# Patient Record
Sex: MALE | Race: BLACK OR AFRICAN AMERICAN | NOT HISPANIC OR LATINO | Employment: FULL TIME | URBAN - METROPOLITAN AREA
[De-identification: names, ages, dates, MRNs, and addresses within clinical notes are randomized per-mention and may not be internally consistent; named-entity substitution may affect disease eponyms.]

---

## 2018-03-23 ENCOUNTER — APPOINTMENT (OUTPATIENT)
Dept: CARDIOLOGY | Facility: CLINIC | Age: 58
End: 2018-03-23
Attending: NURSE PRACTITIONER
Payer: COMMERCIAL

## 2018-03-23 ENCOUNTER — HOSPITAL ENCOUNTER (INPATIENT)
Facility: CLINIC | Age: 58
LOS: 1 days | Discharge: HOME | End: 2018-03-24
Attending: EMERGENCY MEDICINE | Admitting: INTERNAL MEDICINE
Payer: COMMERCIAL

## 2018-03-23 ENCOUNTER — APPOINTMENT (OUTPATIENT)
Dept: GENERAL RADIOLOGY | Facility: CLINIC | Age: 58
End: 2018-03-23
Attending: EMERGENCY MEDICINE
Payer: COMMERCIAL

## 2018-03-23 DIAGNOSIS — I21.4 NSTEMI (NON-ST ELEVATED MYOCARDIAL INFARCTION) (H): ICD-10-CM

## 2018-03-23 PROBLEM — R07.9 CHEST PAIN: Status: ACTIVE | Noted: 2018-03-23

## 2018-03-23 LAB
ANION GAP SERPL CALCULATED.3IONS-SCNC: 8 MMOL/L (ref 3–14)
BASOPHILS # BLD AUTO: 0.1 10E9/L (ref 0–0.2)
BASOPHILS NFR BLD AUTO: 0.9 %
BUN SERPL-MCNC: 12 MG/DL (ref 7–30)
CALCIUM SERPL-MCNC: 8.2 MG/DL (ref 8.5–10.1)
CHLORIDE SERPL-SCNC: 104 MMOL/L (ref 94–109)
CO2 SERPL-SCNC: 25 MMOL/L (ref 20–32)
CREAT SERPL-MCNC: 0.74 MG/DL (ref 0.66–1.25)
DIFFERENTIAL METHOD BLD: NORMAL
EOSINOPHIL # BLD AUTO: 0.4 10E9/L (ref 0–0.7)
EOSINOPHIL NFR BLD AUTO: 7.5 %
ERYTHROCYTE [DISTWIDTH] IN BLOOD BY AUTOMATED COUNT: 12.7 % (ref 10–15)
GFR SERPL CREATININE-BSD FRML MDRD: >90 ML/MIN/1.7M2
GLUCOSE SERPL-MCNC: 114 MG/DL (ref 70–99)
HCT VFR BLD AUTO: 44.7 % (ref 40–53)
HGB BLD-MCNC: 15.1 G/DL (ref 13.3–17.7)
IMM GRANULOCYTES # BLD: 0 10E9/L (ref 0–0.4)
IMM GRANULOCYTES NFR BLD: 0.4 %
INTERPRETATION ECG - MUSE: NORMAL
KCT BLD-ACNC: 261 SEC (ref 75–150)
LYMPHOCYTES # BLD AUTO: 1.6 10E9/L (ref 0.8–5.3)
LYMPHOCYTES NFR BLD AUTO: 29.1 %
MCH RBC QN AUTO: 30 PG (ref 26.5–33)
MCHC RBC AUTO-ENTMCNC: 33.8 G/DL (ref 31.5–36.5)
MCV RBC AUTO: 89 FL (ref 78–100)
MONOCYTES # BLD AUTO: 0.4 10E9/L (ref 0–1.3)
MONOCYTES NFR BLD AUTO: 7.6 %
NEUTROPHILS # BLD AUTO: 3.1 10E9/L (ref 1.6–8.3)
NEUTROPHILS NFR BLD AUTO: 54.5 %
NRBC # BLD AUTO: 0 10*3/UL
NRBC BLD AUTO-RTO: 0 /100
PLATELET # BLD AUTO: 203 10E9/L (ref 150–450)
POTASSIUM SERPL-SCNC: 4 MMOL/L (ref 3.4–5.3)
RBC # BLD AUTO: 5.04 10E12/L (ref 4.4–5.9)
SODIUM SERPL-SCNC: 137 MMOL/L (ref 133–144)
TROPONIN I BLD-MCNC: 0.07 UG/L (ref 0–0.1)
TROPONIN I SERPL-MCNC: 0.08 UG/L (ref 0–0.04)
TROPONIN I SERPL-MCNC: 0.1 UG/L (ref 0–0.04)
TROPONIN I SERPL-MCNC: 0.12 UG/L (ref 0–0.04)
WBC # BLD AUTO: 5.6 10E9/L (ref 4–11)

## 2018-03-23 PROCEDURE — C1769 GUIDE WIRE: HCPCS

## 2018-03-23 PROCEDURE — 84484 ASSAY OF TROPONIN QUANT: CPT

## 2018-03-23 PROCEDURE — 93306 TTE W/DOPPLER COMPLETE: CPT

## 2018-03-23 PROCEDURE — 25000132 ZZH RX MED GY IP 250 OP 250 PS 637

## 2018-03-23 PROCEDURE — C9600 PERC DRUG-EL COR STENT SING: HCPCS | Mod: LD

## 2018-03-23 PROCEDURE — C1887 CATHETER, GUIDING: HCPCS

## 2018-03-23 PROCEDURE — 96375 TX/PRO/DX INJ NEW DRUG ADDON: CPT | Performed by: EMERGENCY MEDICINE

## 2018-03-23 PROCEDURE — 80048 BASIC METABOLIC PNL TOTAL CA: CPT | Performed by: EMERGENCY MEDICINE

## 2018-03-23 PROCEDURE — 93454 CORONARY ARTERY ANGIO S&I: CPT | Mod: 26 | Performed by: INTERNAL MEDICINE

## 2018-03-23 PROCEDURE — 25000132 ZZH RX MED GY IP 250 OP 250 PS 637: Performed by: STUDENT IN AN ORGANIZED HEALTH CARE EDUCATION/TRAINING PROGRAM

## 2018-03-23 PROCEDURE — 85025 COMPLETE CBC W/AUTO DIFF WBC: CPT | Performed by: EMERGENCY MEDICINE

## 2018-03-23 PROCEDURE — 99153 MOD SED SAME PHYS/QHP EA: CPT

## 2018-03-23 PROCEDURE — 93005 ELECTROCARDIOGRAM TRACING: CPT | Performed by: EMERGENCY MEDICINE

## 2018-03-23 PROCEDURE — C1725 CATH, TRANSLUMIN NON-LASER: HCPCS

## 2018-03-23 PROCEDURE — 92941 PRQ TRLML REVSC TOT OCCL AMI: CPT | Mod: LD | Performed by: INTERNAL MEDICINE

## 2018-03-23 PROCEDURE — 96374 THER/PROPH/DIAG INJ IV PUSH: CPT | Performed by: EMERGENCY MEDICINE

## 2018-03-23 PROCEDURE — 99152 MOD SED SAME PHYS/QHP 5/>YRS: CPT

## 2018-03-23 PROCEDURE — 84484 ASSAY OF TROPONIN QUANT: CPT | Performed by: NURSE PRACTITIONER

## 2018-03-23 PROCEDURE — 93010 ELECTROCARDIOGRAM REPORT: CPT | Mod: Z6 | Performed by: EMERGENCY MEDICINE

## 2018-03-23 PROCEDURE — 99291 CRITICAL CARE FIRST HOUR: CPT | Mod: 25 | Performed by: EMERGENCY MEDICINE

## 2018-03-23 PROCEDURE — 36415 COLL VENOUS BLD VENIPUNCTURE: CPT | Performed by: NURSE PRACTITIONER

## 2018-03-23 PROCEDURE — 92929 ZZHC PRQ TRLUML CORONARY BM STENT W/ANGIO ADDL ART/BRNCH: CPT | Mod: RC | Performed by: INTERNAL MEDICINE

## 2018-03-23 PROCEDURE — 84484 ASSAY OF TROPONIN QUANT: CPT | Performed by: EMERGENCY MEDICINE

## 2018-03-23 PROCEDURE — C1874 STENT, COATED/COV W/DEL SYS: HCPCS

## 2018-03-23 PROCEDURE — 25000125 ZZHC RX 250: Performed by: INTERNAL MEDICINE

## 2018-03-23 PROCEDURE — 27210760 ZZH DEVICE INFLATION CR7

## 2018-03-23 PROCEDURE — 93454 CORONARY ARTERY ANGIO S&I: CPT

## 2018-03-23 PROCEDURE — 93005 ELECTROCARDIOGRAM TRACING: CPT

## 2018-03-23 PROCEDURE — 25000128 H RX IP 250 OP 636: Performed by: INTERNAL MEDICINE

## 2018-03-23 PROCEDURE — 36415 COLL VENOUS BLD VENIPUNCTURE: CPT | Performed by: INTERNAL MEDICINE

## 2018-03-23 PROCEDURE — 21400006 ZZH R&B CCU INTERMEDIATE UMMC

## 2018-03-23 PROCEDURE — 85347 COAGULATION TIME ACTIVATED: CPT

## 2018-03-23 PROCEDURE — 27210843 ZZH DEVICE COMPRESSION CR12

## 2018-03-23 PROCEDURE — 25000128 H RX IP 250 OP 636: Performed by: EMERGENCY MEDICINE

## 2018-03-23 PROCEDURE — 25500064 ZZH RX 255 OP 636: Performed by: NURSE PRACTITIONER

## 2018-03-23 PROCEDURE — 27210893 ZZH CATH CR5

## 2018-03-23 PROCEDURE — 27210946 ZZH KIT HC TOTES DISP CR8

## 2018-03-23 PROCEDURE — 27211089 ZZH KIT ACIST INJECTOR CR3

## 2018-03-23 PROCEDURE — 99222 1ST HOSP IP/OBS MODERATE 55: CPT | Mod: 25 | Performed by: NURSE PRACTITIONER

## 2018-03-23 PROCEDURE — 027237Z DILATION OF CORONARY ARTERY, THREE ARTERIES WITH FOUR OR MORE DRUG-ELUTING INTRALUMINAL DEVICES, PERCUTANEOUS APPROACH: ICD-10-PCS | Performed by: INTERNAL MEDICINE

## 2018-03-23 PROCEDURE — 71046 X-RAY EXAM CHEST 2 VIEWS: CPT

## 2018-03-23 PROCEDURE — B2111ZZ FLUOROSCOPY OF MULTIPLE CORONARY ARTERIES USING LOW OSMOLAR CONTRAST: ICD-10-PCS | Performed by: INTERNAL MEDICINE

## 2018-03-23 PROCEDURE — 27210762 ZZH DEVICE SUTURELESS SECUREMENT EA CR2

## 2018-03-23 PROCEDURE — C1894 INTRO/SHEATH, NON-LASER: HCPCS

## 2018-03-23 PROCEDURE — 93010 ELECTROCARDIOGRAM REPORT: CPT | Performed by: INTERNAL MEDICINE

## 2018-03-23 PROCEDURE — 27210787 ZZH MANIFOLD CR2

## 2018-03-23 PROCEDURE — 99285 EMERGENCY DEPT VISIT HI MDM: CPT | Mod: 25 | Performed by: EMERGENCY MEDICINE

## 2018-03-23 PROCEDURE — 93306 TTE W/DOPPLER COMPLETE: CPT | Mod: 26 | Performed by: INTERNAL MEDICINE

## 2018-03-23 PROCEDURE — 84484 ASSAY OF TROPONIN QUANT: CPT | Performed by: INTERNAL MEDICINE

## 2018-03-23 PROCEDURE — 96376 TX/PRO/DX INJ SAME DRUG ADON: CPT | Performed by: EMERGENCY MEDICINE

## 2018-03-23 PROCEDURE — 92928 PRQ TCAT PLMT NTRAC ST 1 LES: CPT | Mod: 59 | Performed by: INTERNAL MEDICINE

## 2018-03-23 PROCEDURE — 27210998 ZZH ACCESS HEART CATH CR6

## 2018-03-23 RX ORDER — ASPIRIN 81 MG/1
81-324 TABLET, CHEWABLE ORAL
Status: DISCONTINUED | OUTPATIENT
Start: 2018-03-23 | End: 2018-03-23 | Stop reason: HOSPADM

## 2018-03-23 RX ORDER — EPTIFIBATIDE 2 MG/ML
1 INJECTION, SOLUTION INTRAVENOUS CONTINUOUS PRN
Status: DISCONTINUED | OUTPATIENT
Start: 2018-03-23 | End: 2018-03-23 | Stop reason: HOSPADM

## 2018-03-23 RX ORDER — ATORVASTATIN CALCIUM 80 MG/1
80 TABLET, FILM COATED ORAL DAILY
Status: DISCONTINUED | OUTPATIENT
Start: 2018-03-23 | End: 2018-03-24 | Stop reason: HOSPADM

## 2018-03-23 RX ORDER — VERAPAMIL HYDROCHLORIDE 2.5 MG/ML
1-5 INJECTION, SOLUTION INTRAVENOUS
Status: DISCONTINUED | OUTPATIENT
Start: 2018-03-23 | End: 2018-03-23 | Stop reason: HOSPADM

## 2018-03-23 RX ORDER — HYDRALAZINE HYDROCHLORIDE 20 MG/ML
10-20 INJECTION INTRAMUSCULAR; INTRAVENOUS
Status: DISCONTINUED | OUTPATIENT
Start: 2018-03-23 | End: 2018-03-23 | Stop reason: HOSPADM

## 2018-03-23 RX ORDER — POTASSIUM CHLORIDE 7.45 MG/ML
10 INJECTION INTRAVENOUS
Status: DISCONTINUED | OUTPATIENT
Start: 2018-03-23 | End: 2018-03-23 | Stop reason: HOSPADM

## 2018-03-23 RX ORDER — NICARDIPINE HYDROCHLORIDE 2.5 MG/ML
100 INJECTION INTRAVENOUS
Status: DISCONTINUED | OUTPATIENT
Start: 2018-03-23 | End: 2018-03-23 | Stop reason: HOSPADM

## 2018-03-23 RX ORDER — ADENOSINE 3 MG/ML
12-12000 INJECTION, SOLUTION INTRAVENOUS
Status: DISCONTINUED | OUTPATIENT
Start: 2018-03-23 | End: 2018-03-23 | Stop reason: HOSPADM

## 2018-03-23 RX ORDER — DIPHENHYDRAMINE HYDROCHLORIDE 50 MG/ML
25-50 INJECTION INTRAMUSCULAR; INTRAVENOUS
Status: DISCONTINUED | OUTPATIENT
Start: 2018-03-23 | End: 2018-03-23 | Stop reason: HOSPADM

## 2018-03-23 RX ORDER — PRASUGREL 10 MG/1
10-60 TABLET, FILM COATED ORAL
Status: DISCONTINUED | OUTPATIENT
Start: 2018-03-23 | End: 2018-03-23 | Stop reason: HOSPADM

## 2018-03-23 RX ORDER — DOBUTAMINE HYDROCHLORIDE 200 MG/100ML
2-20 INJECTION INTRAVENOUS CONTINUOUS PRN
Status: DISCONTINUED | OUTPATIENT
Start: 2018-03-23 | End: 2018-03-23 | Stop reason: HOSPADM

## 2018-03-23 RX ORDER — ASPIRIN 81 MG/1
162-243 TABLET, CHEWABLE ORAL DAILY
COMMUNITY

## 2018-03-23 RX ORDER — PROTAMINE SULFATE 10 MG/ML
1-5 INJECTION, SOLUTION INTRAVENOUS
Status: DISCONTINUED | OUTPATIENT
Start: 2018-03-23 | End: 2018-03-23 | Stop reason: HOSPADM

## 2018-03-23 RX ORDER — NITROGLYCERIN 0.4 MG/1
0.4 TABLET SUBLINGUAL EVERY 5 MIN PRN
Status: DISCONTINUED | OUTPATIENT
Start: 2018-03-23 | End: 2018-03-23

## 2018-03-23 RX ORDER — FENTANYL CITRATE 50 UG/ML
25-50 INJECTION, SOLUTION INTRAMUSCULAR; INTRAVENOUS
Status: ACTIVE | OUTPATIENT
Start: 2018-03-23 | End: 2018-03-24

## 2018-03-23 RX ORDER — NITROGLYCERIN 0.4 MG/1
0.4 TABLET SUBLINGUAL EVERY 5 MIN PRN
Status: DISCONTINUED | OUTPATIENT
Start: 2018-03-23 | End: 2018-03-23 | Stop reason: HOSPADM

## 2018-03-23 RX ORDER — ACETAMINOPHEN 650 MG/1
650 SUPPOSITORY RECTAL EVERY 4 HOURS PRN
Status: DISCONTINUED | OUTPATIENT
Start: 2018-03-23 | End: 2018-03-24 | Stop reason: HOSPADM

## 2018-03-23 RX ORDER — FENTANYL CITRATE 50 UG/ML
25-50 INJECTION, SOLUTION INTRAMUSCULAR; INTRAVENOUS
Status: DISCONTINUED | OUTPATIENT
Start: 2018-03-23 | End: 2018-03-23 | Stop reason: HOSPADM

## 2018-03-23 RX ORDER — FUROSEMIDE 10 MG/ML
20-100 INJECTION INTRAMUSCULAR; INTRAVENOUS
Status: DISCONTINUED | OUTPATIENT
Start: 2018-03-23 | End: 2018-03-23 | Stop reason: HOSPADM

## 2018-03-23 RX ORDER — HEPARIN SODIUM 10000 [USP'U]/100ML
0-3500 INJECTION, SOLUTION INTRAVENOUS CONTINUOUS
Status: DISCONTINUED | OUTPATIENT
Start: 2018-03-23 | End: 2018-03-23

## 2018-03-23 RX ORDER — SODIUM CHLORIDE 9 MG/ML
INJECTION, SOLUTION INTRAVENOUS CONTINUOUS
Status: ACTIVE | OUTPATIENT
Start: 2018-03-23 | End: 2018-03-24

## 2018-03-23 RX ORDER — NALOXONE HYDROCHLORIDE 0.4 MG/ML
0.4 INJECTION, SOLUTION INTRAMUSCULAR; INTRAVENOUS; SUBCUTANEOUS EVERY 5 MIN PRN
Status: DISCONTINUED | OUTPATIENT
Start: 2018-03-23 | End: 2018-03-23 | Stop reason: HOSPADM

## 2018-03-23 RX ORDER — CLOPIDOGREL BISULFATE 75 MG/1
75 TABLET ORAL
Status: DISCONTINUED | OUTPATIENT
Start: 2018-03-23 | End: 2018-03-23 | Stop reason: HOSPADM

## 2018-03-23 RX ORDER — HEPARIN SODIUM 1000 [USP'U]/ML
1000-10000 INJECTION, SOLUTION INTRAVENOUS; SUBCUTANEOUS EVERY 5 MIN PRN
Status: DISCONTINUED | OUTPATIENT
Start: 2018-03-23 | End: 2018-03-23 | Stop reason: HOSPADM

## 2018-03-23 RX ORDER — PROTAMINE SULFATE 10 MG/ML
25-100 INJECTION, SOLUTION INTRAVENOUS EVERY 5 MIN PRN
Status: DISCONTINUED | OUTPATIENT
Start: 2018-03-23 | End: 2018-03-23 | Stop reason: HOSPADM

## 2018-03-23 RX ORDER — NITROGLYCERIN 0.4 MG/1
TABLET SUBLINGUAL
Status: COMPLETED
Start: 2018-03-23 | End: 2018-03-23

## 2018-03-23 RX ORDER — METOPROLOL TARTRATE 1 MG/ML
5 INJECTION, SOLUTION INTRAVENOUS EVERY 5 MIN PRN
Status: DISCONTINUED | OUTPATIENT
Start: 2018-03-23 | End: 2018-03-23 | Stop reason: HOSPADM

## 2018-03-23 RX ORDER — PROMETHAZINE HYDROCHLORIDE 25 MG/ML
6.25-25 INJECTION, SOLUTION INTRAMUSCULAR; INTRAVENOUS EVERY 4 HOURS PRN
Status: DISCONTINUED | OUTPATIENT
Start: 2018-03-23 | End: 2018-03-23 | Stop reason: HOSPADM

## 2018-03-23 RX ORDER — NALOXONE HYDROCHLORIDE 0.4 MG/ML
.2-.4 INJECTION, SOLUTION INTRAMUSCULAR; INTRAVENOUS; SUBCUTANEOUS
Status: ACTIVE | OUTPATIENT
Start: 2018-03-23 | End: 2018-03-24

## 2018-03-23 RX ORDER — ARGATROBAN 1 MG/ML
150 INJECTION, SOLUTION INTRAVENOUS
Status: DISCONTINUED | OUTPATIENT
Start: 2018-03-23 | End: 2018-03-23 | Stop reason: HOSPADM

## 2018-03-23 RX ORDER — METHYLPREDNISOLONE SODIUM SUCCINATE 125 MG/2ML
125 INJECTION, POWDER, LYOPHILIZED, FOR SOLUTION INTRAMUSCULAR; INTRAVENOUS
Status: DISCONTINUED | OUTPATIENT
Start: 2018-03-23 | End: 2018-03-23 | Stop reason: HOSPADM

## 2018-03-23 RX ORDER — FLUMAZENIL 0.1 MG/ML
0.2 INJECTION, SOLUTION INTRAVENOUS
Status: DISCONTINUED | OUTPATIENT
Start: 2018-03-23 | End: 2018-03-23 | Stop reason: HOSPADM

## 2018-03-23 RX ORDER — LORAZEPAM 2 MG/ML
.5-2 INJECTION INTRAMUSCULAR EVERY 4 HOURS PRN
Status: DISCONTINUED | OUTPATIENT
Start: 2018-03-23 | End: 2018-03-23 | Stop reason: HOSPADM

## 2018-03-23 RX ORDER — ACETAMINOPHEN 325 MG/1
650 TABLET ORAL EVERY 4 HOURS PRN
Status: DISCONTINUED | OUTPATIENT
Start: 2018-03-23 | End: 2018-03-24 | Stop reason: HOSPADM

## 2018-03-23 RX ORDER — NIFEDIPINE 10 MG/1
10 CAPSULE ORAL
Status: DISCONTINUED | OUTPATIENT
Start: 2018-03-23 | End: 2018-03-23 | Stop reason: HOSPADM

## 2018-03-23 RX ORDER — ENALAPRILAT 1.25 MG/ML
1.25-2.5 INJECTION INTRAVENOUS
Status: DISCONTINUED | OUTPATIENT
Start: 2018-03-23 | End: 2018-03-23 | Stop reason: HOSPADM

## 2018-03-23 RX ORDER — NITROGLYCERIN 5 MG/ML
100-500 VIAL (ML) INTRAVENOUS
Status: DISCONTINUED | OUTPATIENT
Start: 2018-03-23 | End: 2018-03-23

## 2018-03-23 RX ORDER — EPTIFIBATIDE 2 MG/ML
180 INJECTION, SOLUTION INTRAVENOUS EVERY 10 MIN PRN
Status: DISCONTINUED | OUTPATIENT
Start: 2018-03-23 | End: 2018-03-23 | Stop reason: HOSPADM

## 2018-03-23 RX ORDER — POTASSIUM CHLORIDE 29.8 MG/ML
20 INJECTION INTRAVENOUS
Status: DISCONTINUED | OUTPATIENT
Start: 2018-03-23 | End: 2018-03-23 | Stop reason: HOSPADM

## 2018-03-23 RX ORDER — ONDANSETRON 2 MG/ML
4 INJECTION INTRAMUSCULAR; INTRAVENOUS EVERY 4 HOURS PRN
Status: DISCONTINUED | OUTPATIENT
Start: 2018-03-23 | End: 2018-03-23 | Stop reason: HOSPADM

## 2018-03-23 RX ORDER — SODIUM NITROPRUSSIDE 25 MG/ML
100-200 INJECTION INTRAVENOUS
Status: DISCONTINUED | OUTPATIENT
Start: 2018-03-23 | End: 2018-03-23 | Stop reason: HOSPADM

## 2018-03-23 RX ORDER — NITROGLYCERIN 5 MG/ML
100-200 VIAL (ML) INTRAVENOUS
Status: DISCONTINUED | OUTPATIENT
Start: 2018-03-23 | End: 2018-03-23 | Stop reason: HOSPADM

## 2018-03-23 RX ORDER — ASPIRIN 325 MG
325 TABLET ORAL
Status: DISCONTINUED | OUTPATIENT
Start: 2018-03-23 | End: 2018-03-23 | Stop reason: HOSPADM

## 2018-03-23 RX ORDER — DEXTROSE MONOHYDRATE 25 G/50ML
12.5-5 INJECTION, SOLUTION INTRAVENOUS EVERY 30 MIN PRN
Status: DISCONTINUED | OUTPATIENT
Start: 2018-03-23 | End: 2018-03-23 | Stop reason: HOSPADM

## 2018-03-23 RX ORDER — IOPAMIDOL 755 MG/ML
50 INJECTION, SOLUTION INTRAVASCULAR ONCE
Status: COMPLETED | OUTPATIENT
Start: 2018-03-23 | End: 2018-03-23

## 2018-03-23 RX ORDER — LIDOCAINE 40 MG/G
CREAM TOPICAL
Status: DISCONTINUED | OUTPATIENT
Start: 2018-03-23 | End: 2018-03-24 | Stop reason: HOSPADM

## 2018-03-23 RX ORDER — DOPAMINE HYDROCHLORIDE 160 MG/100ML
2-20 INJECTION, SOLUTION INTRAVENOUS CONTINUOUS PRN
Status: DISCONTINUED | OUTPATIENT
Start: 2018-03-23 | End: 2018-03-23 | Stop reason: HOSPADM

## 2018-03-23 RX ORDER — ASPIRIN 81 MG/1
81 TABLET ORAL DAILY
Status: DISCONTINUED | OUTPATIENT
Start: 2018-03-24 | End: 2018-03-24 | Stop reason: HOSPADM

## 2018-03-23 RX ORDER — ASPIRIN 81 MG/1
81 TABLET ORAL DAILY
Status: DISCONTINUED | OUTPATIENT
Start: 2018-03-23 | End: 2018-03-23

## 2018-03-23 RX ORDER — CLOPIDOGREL BISULFATE 75 MG/1
300-600 TABLET ORAL
Status: DISCONTINUED | OUTPATIENT
Start: 2018-03-23 | End: 2018-03-23 | Stop reason: HOSPADM

## 2018-03-23 RX ORDER — PHENYLEPHRINE HCL IN 0.9% NACL 1 MG/10 ML
20-100 SYRINGE (ML) INTRAVENOUS
Status: DISCONTINUED | OUTPATIENT
Start: 2018-03-23 | End: 2018-03-23 | Stop reason: HOSPADM

## 2018-03-23 RX ORDER — NALOXONE HYDROCHLORIDE 0.4 MG/ML
.1-.4 INJECTION, SOLUTION INTRAMUSCULAR; INTRAVENOUS; SUBCUTANEOUS
Status: DISCONTINUED | OUTPATIENT
Start: 2018-03-23 | End: 2018-03-24 | Stop reason: HOSPADM

## 2018-03-23 RX ORDER — ARGATROBAN 1 MG/ML
350 INJECTION, SOLUTION INTRAVENOUS
Status: DISCONTINUED | OUTPATIENT
Start: 2018-03-23 | End: 2018-03-23 | Stop reason: HOSPADM

## 2018-03-23 RX ORDER — NITROGLYCERIN 0.4 MG/1
0.4 TABLET SUBLINGUAL EVERY 5 MIN PRN
Status: DISCONTINUED | OUTPATIENT
Start: 2018-03-23 | End: 2018-03-24 | Stop reason: HOSPADM

## 2018-03-23 RX ORDER — EPTIFIBATIDE 2 MG/ML
2 INJECTION, SOLUTION INTRAVENOUS CONTINUOUS PRN
Status: DISCONTINUED | OUTPATIENT
Start: 2018-03-23 | End: 2018-03-23 | Stop reason: HOSPADM

## 2018-03-23 RX ORDER — NITROGLYCERIN 20 MG/100ML
.07-2 INJECTION INTRAVENOUS CONTINUOUS PRN
Status: DISCONTINUED | OUTPATIENT
Start: 2018-03-23 | End: 2018-03-23 | Stop reason: HOSPADM

## 2018-03-23 RX ORDER — ATROPINE SULFATE 0.1 MG/ML
.5-1 INJECTION INTRAVENOUS
Status: DISCONTINUED | OUTPATIENT
Start: 2018-03-23 | End: 2018-03-23 | Stop reason: HOSPADM

## 2018-03-23 RX ORDER — MAGNESIUM HYDROXIDE 1200 MG/15ML
1000 LIQUID ORAL CONTINUOUS PRN
Status: DISCONTINUED | OUTPATIENT
Start: 2018-03-23 | End: 2018-03-23 | Stop reason: HOSPADM

## 2018-03-23 RX ORDER — EPINEPHRINE 1 MG/ML
0.3 INJECTION, SOLUTION, CONCENTRATE INTRAVENOUS
Status: DISCONTINUED | OUTPATIENT
Start: 2018-03-23 | End: 2018-03-23 | Stop reason: HOSPADM

## 2018-03-23 RX ORDER — FLUMAZENIL 0.1 MG/ML
0.2 INJECTION, SOLUTION INTRAVENOUS
Status: ACTIVE | OUTPATIENT
Start: 2018-03-23 | End: 2018-03-24

## 2018-03-23 RX ORDER — LIDOCAINE HYDROCHLORIDE 10 MG/ML
30 INJECTION, SOLUTION EPIDURAL; INFILTRATION; INTRACAUDAL; PERINEURAL
Status: DISCONTINUED | OUTPATIENT
Start: 2018-03-23 | End: 2018-03-23 | Stop reason: HOSPADM

## 2018-03-23 RX ORDER — ATROPINE SULFATE 0.1 MG/ML
0.5 INJECTION INTRAVENOUS EVERY 5 MIN PRN
Status: ACTIVE | OUTPATIENT
Start: 2018-03-23 | End: 2018-03-24

## 2018-03-23 RX ADMIN — TICAGRELOR 180 MG: 90 TABLET ORAL at 18:25

## 2018-03-23 RX ADMIN — NITROGLYCERIN 0.4 MG: 0.4 TABLET SUBLINGUAL at 09:03

## 2018-03-23 RX ADMIN — Medication 5000 UNITS: at 15:47

## 2018-03-23 RX ADMIN — FENTANYL CITRATE 50 MCG: 50 INJECTION, SOLUTION INTRAMUSCULAR; INTRAVENOUS at 16:26

## 2018-03-23 RX ADMIN — FENTANYL CITRATE 50 MCG: 50 INJECTION, SOLUTION INTRAMUSCULAR; INTRAVENOUS at 15:42

## 2018-03-23 RX ADMIN — NICARDIPINE HYDROCHLORIDE 200 MCG: 2.5 INJECTION INTRAVENOUS at 15:48

## 2018-03-23 RX ADMIN — NITROGLYCERIN 200 MCG: 5 INJECTION, SOLUTION INTRAVENOUS at 15:47

## 2018-03-23 RX ADMIN — IOPAMIDOL 120 ML: 755 INJECTION, SOLUTION INTRAVASCULAR at 15:26

## 2018-03-23 RX ADMIN — METOPROLOL TARTRATE 12.5 MG: 25 TABLET, FILM COATED ORAL at 19:58

## 2018-03-23 RX ADMIN — HUMAN ALBUMIN MICROSPHERES AND PERFLUTREN 5 ML: 10; .22 INJECTION, SOLUTION INTRAVENOUS at 12:09

## 2018-03-23 RX ADMIN — ATORVASTATIN CALCIUM 80 MG: 80 TABLET, FILM COATED ORAL at 18:25

## 2018-03-23 RX ADMIN — MIDAZOLAM 1 MG: 1 INJECTION INTRAMUSCULAR; INTRAVENOUS at 16:57

## 2018-03-23 RX ADMIN — MIDAZOLAM 1 MG: 1 INJECTION INTRAMUSCULAR; INTRAVENOUS at 15:42

## 2018-03-23 RX ADMIN — HEPARIN SODIUM 850 UNITS/HR: 10000 INJECTION, SOLUTION INTRAVENOUS at 09:24

## 2018-03-23 RX ADMIN — NICARDIPINE HYDROCHLORIDE 200 MCG: 2.5 INJECTION INTRAVENOUS at 16:37

## 2018-03-23 RX ADMIN — HEPARIN SODIUM 1500 UNITS: 1000 INJECTION, SOLUTION INTRAVENOUS; SUBCUTANEOUS at 15:43

## 2018-03-23 ASSESSMENT — ENCOUNTER SYMPTOMS
AGITATION: 0
BRUISES/BLEEDS EASILY: 0
LIGHT-HEADEDNESS: 0
COLOR CHANGE: 0
ABDOMINAL PAIN: 0
CHILLS: 0
BACK PAIN: 0
FEVER: 0
POLYDIPSIA: 0
SHORTNESS OF BREATH: 0

## 2018-03-23 NOTE — ED NOTES
[facility], [place]   ED Nurse to Floor Handoff     Rodrigo Wallace is a 57 year old male who speaks Congolese and lives with family members,  In a home.  They arrived in the ED by ambulance from emergency room    ED Chief Complaint: Chest Pain (States he has sharp precordial chest pain that started 5-6 days ago.  )    ED Dx;   Final diagnoses:   NSTEMI (non-ST elevated myocardial infarction) (H)         Needed?: No    Allergies: No Known Allergies.  Past Medical Hx:   Past Medical History:   Diagnosis Date     High cholesterol      Myocardial infarction 2008    States he has 1 sent placed.      Baseline Mental status: WDL  Current Mental Status changes: {Current Mental Status Changes:094481    Infection present or suspected this encounter: no  Sepsis suspected: No  Isolation type: No active isolations     Activity level - Baseline/Home:  Independent  Activity Level - Current:   Independent    Bariatric equipment needed?: No    In the ED these meds were given:   Medications   nitroGLYcerin (NITROSTAT) sublingual tablet 0.4 mg (0.4 mg Sublingual Given 3/23/18 0903)   heparin  drip 25,000 units in 0.45% NaCl 250 mL (see additional administration details for dose) (850 Units/hr Intravenous Rate/Dose Verify 3/23/18 1015)   heparin bolus from infusion pump (not administered)   heparin Loading Dose from infusion pump *Give when STARTING heparin infusion 4,200 Units (4,200 Units Intravenous Given 3/23/18 0906)   perflutren diluted 1mL to 2mL with saline (OPTISON) diluted injection 5 mL (5 mLs Intravenous Given 3/23/18 1209)       Drips running?  Yes    Home pump  No    Current LDAs  Peripheral IV 03/23/18 Right (Active)   Number of days:0       Labs results:   Labs Ordered and Resulted from Time of ED Arrival Up to the Time of Departure from the ED   BASIC METABOLIC PANEL - Abnormal; Notable for the following:        Result Value    Glucose 114 (*)     Calcium 8.2 (*)     All other  components within normal limits   TROPONIN I - Abnormal; Notable for the following:     Troponin I ES 0.117 (*)     All other components within normal limits   CBC WITH PLATELETS DIFFERENTIAL   PERIPHERAL IV CATHETER   CARDIAC CONTINUOUS MONITORING   PULSE OXIMETRY NURSING   PLATELETS MONITORED PER HEPARIN TREATMENT PROTOCOL (FOR MEANINGFUL USE   TROPONIN POCT       Imaging Studies:   Recent Results (from the past 24 hour(s))   XR Chest 2 Views    Narrative    XR CHEST 2 VW 3/23/2018 8:43 AM    HISTORY: Pain.    COMPARISON: None.      Impression    IMPRESSION: The lungs are clear. No focal pulmonary opacities. Heart  and mediastinum are unremarkable. No acute cardiopulmonary  abnormalities.    TITI BOYD MD       Recent vital signs:   /85  Pulse 61  Temp 98.1  F (36.7  C) (Oral)  Resp 12  Wt 69.6 kg (153 lb 6 oz)  SpO2 97%    Cardiac Rhythm: Normal Sinus  Pt needs tele? Yes  Skin/wound Issues: None    Code Status: Full Code    Pain control: good    Nausea control: pt had none    Abnormal labs/tests/findings requiring intervention: elevated Trop    Family present during ED course? Yes   Family Comments/Social Situation comments: Family at bedside    Tasks needing completion: Possible cardiac cath lab procedure.  Repeat Trop.     Meche Cruz RN  ascom-- [phone]  [phone] [place] ED  [phone] [facility] ED

## 2018-03-23 NOTE — ED PROVIDER NOTES
History     Chief Complaint   Patient presents with     Chest Pain     States he has sharp precordial chest pain that started 5-6 days ago.       LEAH Wallace is a 57-year-old man with history of myocardial infarction and angioplasty approximately 10 years ago presenting with intermittent chest pain over the last week.  Pain is mild to moderate to, pressure-like, located in the left side of the chest, nonradiating.  Exertion makes the pain worse, rest improves it.  The last episode of chest pain started this morning after patient woke up.  He denies any nausea, vomiting.  He has had occasional diaphoresis with pain, however, none today.  He did take 3 tablets of 81 mg aspirin this morning after he woke up due to the pain.  He denies any shortness of breath, lightheadedness, fevers, cough, or shortness of breath. Does not take any chronic daily medications, however, he does have history of hypercholesterolemia.  No history of diabetes.  Denies smoking.      This part of the document was transcribed by Tere Tom Scribe.    Past Medical History:   Diagnosis Date     High cholesterol      Myocardial infarction 2008    States he has 1 sent placed.       Past Surgical History:   Procedure Laterality Date     CARDIAC SURGERY  2008    Stent place through right groin       No family history on file.    Social History   Substance Use Topics     Smoking status: Former Smoker     Smokeless tobacco: Never Used      Comment: Quit smoking 2003     Alcohol use No       Current Facility-Administered Medications   Medication     nitroGLYcerin (NITROSTAT) sublingual tablet 0.4 mg     heparin  drip 25,000 units in 0.45% NaCl 250 mL (see additional administration details for dose)     heparin bolus from infusion pump     Current Outpatient Prescriptions   Medication     aspirin (ASPIRIN 81) 81 MG chewable tablet      No Known Allergies      I have reviewed the Medications, Allergies, Past Medical and Surgical  History, and Social History in the Epic system.    Review of Systems   Constitutional: Negative for chills and fever.   HENT: Negative for congestion.    Respiratory: Negative for shortness of breath.    Cardiovascular: Positive for chest pain. Negative for leg swelling.   Gastrointestinal: Negative for abdominal pain.   Endocrine: Negative for polydipsia and polyuria.   Musculoskeletal: Negative for back pain.   Skin: Negative for color change.   Allergic/Immunologic: Negative for immunocompromised state.   Neurological: Negative for light-headedness.   Hematological: Does not bruise/bleed easily.   Psychiatric/Behavioral: Negative for agitation.   All other systems reviewed and are negative.      Physical Exam   BP: 146/82  Pulse: 61  Heart Rate: 63  Temp: 97.6  F (36.4  C)  Resp: 20  Weight: 69.6 kg (153 lb 6 oz)  SpO2: 99 %      Physical Exam   Constitutional: He is oriented to person, place, and time. He appears well-developed and well-nourished. No distress.   HENT:   Head: Normocephalic and atraumatic.   Mouth/Throat: Oropharynx is clear and moist. No oropharyngeal exudate.   Eyes: Conjunctivae and EOM are normal. No scleral icterus.   Neck: Normal range of motion.   Cardiovascular: Normal rate, normal heart sounds and intact distal pulses.    Pulmonary/Chest: Effort normal and breath sounds normal. No respiratory distress. He has no wheezes.   Abdominal: Soft. Bowel sounds are normal. There is no tenderness.   Musculoskeletal: Normal range of motion. He exhibits no edema or tenderness.   Neurological: He is alert and oriented to person, place, and time. No cranial nerve deficit. He exhibits normal muscle tone. Coordination normal.   Skin: Skin is warm. No rash noted. He is not diaphoretic.   Psychiatric: He has a normal mood and affect. His behavior is normal. Judgment and thought content normal.   Nursing note and vitals reviewed.      ED Course     ED Course   Comment Time   [facility] [employer] notified of patient's arrival 03/23 1006     Procedures             EKG Interpretation:      Interpreted by Riana Witt  Time reviewed: 8:09 AM  Symptoms at time of EKG: Chest pain  Rhythm: normal sinus   Rate: normal  Axis: normal  Ectopy: none  Conduction: normal  ST Segments/ T Waves: No ST-T wave changes  Q Waves: none  Comparison to prior: No old EKG available    Clinical Impression: normal EKG          Critical Care time:  was 40 minutes for this patient excluding procedures.             Labs Ordered and Resulted from Time of ED Arrival Up to the Time of Departure from the ED   BASIC METABOLIC PANEL - Abnormal; Notable for the following:        Result Value    Glucose 114 (*)     Calcium 8.2 (*)     All other components within normal limits   TROPONIN I - Abnormal; Notable for the following:     Troponin I ES 0.117 (*)     All other components within normal limits   CBC WITH PLATELETS DIFFERENTIAL   PERIPHERAL IV CATHETER   CARDIAC CONTINUOUS MONITORING   PULSE OXIMETRY NURSING   PLATELETS MONITORED PER HEPARIN TREATMENT PROTOCOL (FOR MEANINGFUL USE   TROPONIN POCT            Assessments & Plan (with Medical Decision Making)   57-year-old man presenting with chest pain.  Differential diagnosis ACS, subdural spasm, GERD, pneumonia, pneumothorax, unlikely pulmonary embolus or aortic dissection.    After thorough history and physical exam patient appears to be in no acute distress.  I'll obtain laboratory studies, EKG, and chest x-ray.  EKG showed no acute ischemia.  I reviewed the chest x-ray and I read the radiology report; there is no evidence of pneumonia, pneumothorax, or widened mediastinum.  Laboratory studies returned with no leukocytosis, WBC is 5,600.  There is no anemia, hemoglobin is normal at 15.1.  Electrolytes show evidence of dehydration, creatinine is normal at 0.74. Troponin is elevated 0.117.  Given these findings and patient's presentation I do believe that he has a non-ST  elevation myocardial infarction.  He was given a dose of sublingual nitroglycerin which relieved his pain entirely.  He is placed on intravenous heparin infusion.  I spoke to the [facility] cardiology service, Dr. Luz; plan was made for the patient be transported to the [facility] emergency department for evaluation by cardiology for potential percutaneous coronary intervention.  Patient and family were updated by this and they agree with plan.  Report was given to ask Dr. Hoskins in the emergency department.    This part of the document was transcribed by Tracy Witt Medical Scribe.    I have reviewed the nursing notes.    I have reviewed the findings, diagnosis, plan and need for follow up with the patient.    New Prescriptions    No medications on file       Final diagnoses:   NSTEMI (non-ST elevated myocardial infarction) (H)   I was physically present and have reviewed and verified the accuracy of this note documented by my scribe.    Riana Witt MD        3/23/2018   [facility], [place], EMERGENCY DEPARTMENT     Riana Witt MD  03/23/18 2620

## 2018-03-23 NOTE — IP AVS SNAPSHOT
[address] [address]    Phone:  [phone]                                       After Visit Summary   3/23/2018    Rodrigo Wallace    MRN: [id number]           After Visit Summary Signature Page     I have received my discharge instructions, and my questions have been answered. I have discussed any challenges I see with this plan with the nurse or doctor.    ..........................................................................................................................................  Patient/Patient Representative Signature      ..........................................................................................................................................  Patient Representative Print Name and Relationship to Patient    ..................................................               ................................................  Date                                            Time    ..........................................................................................................................................  Reviewed by Signature/Title    ...................................................              ..............................................  Date                                                            Time

## 2018-03-23 NOTE — PROGRESS NOTES
Pt admitted from cath lab at 1700.  R radial TR band in place.  Plan to continue deflating TR band.  7 cc's of air remaining.  VSS, SR.  A&Ox4, on RA.  Pt Estonian and English speaking.  Pt's cousin, who speaks English, at bedside.  Pt up ad rajesh.  Plan to possibly d/c to home tomorrow.  Will continue to monitor and notify [facility] with any concerns or questions.

## 2018-03-23 NOTE — IP AVS SNAPSHOT
MRN:[id number]                      After Visit Summary   3/23/2018    Rodrigo Wallace    MRN: [id number]           Thank you!     Thank you for choosing [facility] for your care. Our goal is always to provide you with excellent care. Hearing back from our patients is one way we can continue to improve our services. Please take a few minutes to complete the written survey that you may receive in the mail after you visit with us. Thank you!        Patient Information     Date Of Birth          1960        Designated Caregiver       Most Recent Value    Caregiver    Will someone help with your care after discharge? yes    Name of designated caregiver Radha Colvin    Phone number of caregiver [phone]    Caregiver address [address] [place] [address] [address], [place] [address]      About your hospital stay     You were admitted on:  March 23, 2018 You last received care in the:  [address] [facility]    You were discharged on:  March 24, 2018        Reason for your hospital stay       You hospitalized for a small heart attack for which you received stents to the blood vessels in your heart.                  Who to Call     For medical emergencies, please call 911.  For non-urgent questions about your medical care, please call your primary care provider or clinic, None          Attending Provider     Provider Specialty    Riana Witt MD Emergency Medicine    Epi Crump MD Cardiology       Primary Care Provider Fax #    Physician No Ref-Primary [phone]      After Care Instructions     Activity       Your activity upon discharge: Off of work for 1 week (through April 1) with no heavy lifting, then okay to resume normal activities            Diet       Follow this diet upon discharge: Orders Placed This Encounter      Advance Diet as Tolerated: Regular Diet Adult                  Follow-up Appointments     Adult [facility] Follow-up and recommended labs and tests       Follow up  with primary care provider, Physician No Ref-Primary, within 7 days for hospital follow- up.  No follow up labs or test are needed.      Appointments on [place] and/or [facility] (with [facility] or [facility] provider or service). Call [phone] if you haven't heard regarding these appointments within 7 days of discharge.                  Additional Services     CARDIAC REHAB REFERRAL       Patient may choose their preference of the site for Cardiac Rehab.            Cardiology Eval Adult Referral       Preferred location:  [facility] [phone]   [email or web address]  Dr. Crump to see in 1 month    Please be aware that coverage of these services is subject to the terms and limitations of your health insurance plan.  Call member services at your health plan with any benefit or coverage questions.      Please bring the following to your appointment:  Any x-rays, CTs or MRIs which have been performed. Contact the facility where they were done to arrange for  prior to your scheduled appointment.    List of current medications  This referral request   Any documents/labs given to you for this referral                  Pending Results     Date and Time Order Name Status Description    3/23/2018 1902 EKG 12-lead, tracing only  Preliminary             Statement of Approval     Ordered          03/24/18 1226  I have reviewed and agree with all the recommendations and orders detailed in this document.  EFFECTIVE NOW     Approved and electronically signed by:  Diego Tamayo MD             Admission Information     Date & Time Provider Department Dept. Phone    3/23/2018 Epi Crump MD [address] [facility] [phone]      Your Vitals Were     Blood Pressure Pulse Temperature Respirations Weight Pulse Oximetry    134/97 (BP Location: Left arm) 67 98.3  F (36.8  C) (Oral) 16 66.4 kg (146 lb 4.8 oz) 99%      MyChart  "Information     [employer] lets you send messages to your doctor, view your test results, renew your prescriptions, schedule appointments and more. To sign up, go to [email or web address] . Click on \"Log in\" on the left side of the screen, which will take you to the Welcome page. Then click on \"Sign up Now\" on the right side of the page.     You will be asked to enter the access code listed below, as well as some personal information. Please follow the directions to create your username and password.     Your access code is: [email or web address]  Expires: 2018  9:12 AM     Your access code will  in 90 days. If you need help or a new code, please call your [place] clinic or [phone].        Care EveryWhere ID     This is your Care EveryWhere ID. This could be used by other organizations to access your [place] medical records  [id number]        Equal Access to Services     ADRIANA CAMPO AH: Haderick bentleyo Soemilee, waaxda luwilfred, qaybta kaalmada adedoris, samira randall . So [facility] [phone].    ATENCIÓN: Si habla español, tiene a pacheco disposición servicios gratuitos de asistencia lingüística. Llame al [phone].    We comply with applicable federal civil rights laws and [place] laws. We do not discriminate on the basis of race, color, national origin, age, disability, sex, sexual orientation, or gender identity.               Review of your medicines      START taking        Dose / Directions    atorvastatin 80 MG tablet   Commonly known as:  LIPITOR   Used for:  NSTEMI (non-ST elevated myocardial infarction) (H)        Dose:  80 mg   Start taking on:  3/25/2018   Take 1 tablet (80 mg) by mouth daily   Quantity:  90 tablet   Refills:  3       metoprolol tartrate 25 MG tablet   Commonly known as:  LOPRESSOR   Used for:  NSTEMI (non-ST elevated myocardial infarction) (H)        Dose:  12.5 mg   Take 0.5 tablets (12.5 mg) by mouth 2 times daily   Quantity:  90 tablet "   Refills:  3       ticagrelor 90 MG tablet   Commonly known as:  BRILINTA   Used for:  NSTEMI (non-ST elevated myocardial infarction) (H)        Dose:  90 mg   Take 1 tablet (90 mg) by mouth 2 times daily   Quantity:  120 tablet   Refills:  3         CONTINUE these medicines which have NOT CHANGED        Dose / Directions    ASPIRIN 81 81 MG chewable tablet   Generic drug:  aspirin        Dose:  162-243 mg   Take 162-243 mg by mouth daily   Refills:  0            Where to get your medicines      These medications were sent to [facility] - [place], [place] - [address]  [address] [address]     Phone:  [phone]     atorvastatin 80 MG tablet    metoprolol tartrate 25 MG tablet    ticagrelor 90 MG tablet                Protect others around you: Learn how to safely use, store and throw away your medicines at www.disposemymeds.org.             Medication List: This is a list of all your medications and when to take them. Check marks below indicate your daily home schedule. Keep this list as a reference.      Medications           Morning Afternoon Evening Bedtime As Needed    ASPIRIN 81 81 MG chewable tablet   Take 162-243 mg by mouth daily   Generic drug:  aspirin                                atorvastatin 80 MG tablet   Commonly known as:  LIPITOR   Take 1 tablet (80 mg) by mouth daily   Start taking on:  3/25/2018   Last time this was given:  80 mg on 3/24/2018  7:56 AM                                metoprolol tartrate 25 MG tablet   Commonly known as:  LOPRESSOR   Take 0.5 tablets (12.5 mg) by mouth 2 times daily   Last time this was given:  12.5 mg on 3/24/2018  7:57 AM                                ticagrelor 90 MG tablet   Commonly known as:  BRILINTA   Take 1 tablet (90 mg) by mouth 2 times daily   Last time this was given:  90 mg on 3/24/2018  7:56 AM

## 2018-03-23 NOTE — ED NOTES
Bed: ED20  Expected date: 3/23/18  Expected time: 9:56 AM  Means of arrival:   Comments:  Vinicius Colvin  Coming from [facility]  57 male  This am c/o right arm weakness and CP  Trop 0.117  CXR done  's/80's. HR 60's, sinus   Nitro at 903 with relief  Heparin bolus 4,200 at 0906, start heparin gtt at 850 units/hr

## 2018-03-23 NOTE — PHARMACY-ADMISSION MEDICATION HISTORY
Admission medication history interview status for the 3/23/2018 admission is complete. See Epic admission navigator for allergy information, pharmacy, prior to admission medications and immunization status.     Medication history interview sources:  Patient    Changes made to PTA medication list (reason)  Added: None  Deleted: None  Changed: ASA 2-> 2-3 tabs per pt report    Additional medication history information (including reliability of information, actions taken by pharmacist):  - Patient takes 2-3 baby aspirin daily , he took 3 tabs this morning  - Pt did not get flu shot this year and has not been on antibiotics recently    Prior to Admission medications    Medication Sig Last Dose Taking? Auth Provider   aspirin (ASPIRIN 81) 81 MG chewable tablet Take 162-243 mg by mouth daily  Yes Unknown, Entered By History         Medication history completed by: Betty Doyle, PharmD Resident

## 2018-03-23 NOTE — PROCEDURES
PRELIMINARY CARDIAC CATH REPORT:     PROCEDURES PERFORMED:   Coronary Angiography  Percutaneous Coronary Intervention    PHYSICIANS:  Attending Physician: Epi Crump MD  Interventional Cardiology Fellow: None  Cardiology Fellow: Casper Mooney MD    INDICATION:  Rodrigo Wallace is a 57 year old male with risk factor profile (-) HTN, (-) DM, (+) hypercholesterolemia, (+)  prior ? pack-year tobacco use, (-) fam Hx premature CAD, who presents on an urgent basis. The clinical presentation was NSTEMI (CCS IV). The indication for the procedure was NSTEMI.    DESCRIPTION:  1. Consent obtained with discussion of risks.  All questions were answered.  2. Sterile prep and procedure.  3. Location with Sheaths:   RT Radial Arterial  6 Fr  10 cm [short]  4. Access: Local anesthetic with lidocaine.  A micropuncture 21 guage needle with ultrasound guidance was used to establish vascular access using a modified Seldinger technique.  5. Diagnostic Catheters:   6 Fr  Juan Pablo  6. Guiding Catheters:  6 Fr  XB LF 3.5 GC JR4  6. Estimated blood loss: < 25 ml    MEDICATIONS:  The procedure was performed under conscious sedation for 60 minutes from 1540 to 1640.  The patient was assessed immediately before the first sedation medication was administered.  Midazolam 2 mg and Fentanyl 100 mcg were administered.  Heart rate, BP, respiration, oxygen saturation and patient responses were monitored throughout the procedure with the assistance of the RN under my supervision.  >> IA Nicardipine and IA NTG were administered to prophylax against radial artery spasm.  >> IV UFH 5,000 U was administered to achieve anticoagulation.  >> Antiplatelet Therapy: Ticagrelor 180 mg     Procedures:    CORONARY ANGIOGRAM:   1. Both coronary arteries arise from their respective cusps.  2. Dominance: Right  3. The LM is without angiographic evidence of disease.   4. LAD: Type 3 [LAD supplies the entire apex].. The LAD gives rise to septal perforators,  a large D1 and D2.  The pLAD has a 95% stenosis with thrombus within lesion, starting after first septal and ending before first diagonal, The rest of the LAD had mild luminal irregularities.    5. LCX gives rise to medium size OM1 and OM2 vessels.  The mLCx has a 30% stenosis just after OM2.  OM1 and OM2 had luminal irregularities.    6. RCA gives rise to 3PL branches and supplies PDA. The pRCA to mRCA had sequential 10-20% stenosis,  RPDA has a 85% calcified and thrombotic lesion.        PERCUTANEOUS CORONARY INTERVENTION:  Please see CVIS report    Sheath Removal:  The RRA sheath was manually removed in the cardiac catheterization laboratory.    Contrast: Isovue, 130 ml     Fluoroscopy Time: 19.8 min    COMPLICATIONS:  1. oRCA had a guide dissection which was successfully stented with a 3.5x16 VIRAL.     SUMMARY:   Two vessel CAD RCA and LAD  Successful deployment of a drug eluting stent to the RPDA, oRCA and pLAD.                                                        PLAN:   >> ASA 81 mg qd and Ticagrelor 90 mg BID  >> Bedrest per protocol.  >> Continued medical management and lifestyle modification for cardiovascular risk factor optimization.   >>. Return to the primary inpatient team for further evaluation and management.    The attending interventional cardiologist was present and supervised all critical aspects the procedure.    Findings discussed with Dr. Crump.    See CVIS report for final draft.    Casper Mooney MD   Cardiology Fellow    Epi Crump MD   Cardiology Staff          I have been present for the entire procedure and performed all critical parts. Agree with the note above.    Epi Crump MD  Interventional Cardiology  Pager: [phone]

## 2018-03-23 NOTE — PROGRESS NOTES
Admission          3/23/2018  8:00 AM  -----------------------------------------------------------  Diagnosis:    Admitted from: cath lab  Via: stretcher  Accompanied by: family  Belongings: Placed in closet; valuables sent home with family  Admission Profile: complete  Teaching: orientation to unit, call don't fall, use of console, meal times, visiting hours,  when to call for the RN (angina/sob/dizzyness, etc.), and enforced importance of safety   Access: PIV  Telemetry:Placed on pt  Ht./Wt.: complete    Temp:  [97.6  F (36.4  C)-98.1  F (36.7  C)] 98.1  F (36.7  C)  Pulse:  [61] 61  Heart Rate:  [59-73] 60  Resp:  [12-30] 17  BP: (116-146)/(72-99) 116/72  SpO2:  [83 %-99 %] 93 %

## 2018-03-23 NOTE — H&P
History and Physical     Rodrigo Wallace  MRN# [id number]        Date of Admission:  3/23/2018    HPI: Rodrigo Wallace is a 57 year old male PMHx history of previous NSTEMI s/p angioplasty in 2008, [place] and former smoker who presents with chest pain. Chest pain is described as a pressure. It started over the last six days, and has become worse and more frequent. Pain is central to left lateral without radiation. It is unrelated to breathing or eating. It is exacerbated with exertion and relieved with rest. Patient reports that this is not similar to his previous MI in 2008. He did feel nauseous and diaphoretic over the last week but not currently. He is currently chest pain free. He denies any SOB, palpitations, recent illnesses or fevers, light headedness, N/V/D. On arrival to ED he was found to have an elevated Troponin. He is without other significant medical hx.    Past Medical History:  Past Medical History:   Diagnosis Date     High cholesterol      Myocardial infarction 2008    States he has 1 sent placed.       Past Surgical History:  Past Surgical History:   Procedure Laterality Date     CARDIAC SURGERY  2008    Stent place through right groin        Allergies:   No Known Allergies    Medications:    No current facility-administered medications on file prior to encounter.   No current outpatient prescriptions on file prior to encounter.    Social History:  Social History     Social History     Marital status:      Spouse name: N/A     Number of children: N/A     Years of education: N/A     Occupational History     Not on file.     Social History Main Topics     Smoking status: Former Smoker     Smokeless tobacco: Never Used      Comment: Quit smoking 2003     Alcohol use No     Drug use: No     Sexual activity: Not on file     Other Topics Concern     Not on file     Social History Narrative     No narrative on file       Family History:  No family history on  file.    ROS:  Negative besides that noted in HPI    Exam:  Temp:  [97.6  F (36.4  C)-98.1  F (36.7  C)] 98.1  F (36.7  C)  Pulse:  [61] 61  Heart Rate:  [59-68] 59  Resp:  [12-30] 12  BP: (133-146)/(81-99) 135/85  SpO2:  [83 %-99 %] 97 %  General: Alert, interactive, NAD  Eyes: sclera anicteric, EOMI  Resp: clear to auscultation bilaterally, no crackles or wheezes  Cardiovascular: regular rate and rhythm, no murmur  GI: Soft, nontender, nondistended. +BS.  No HSM or masses, no rebound or guarding.  MSK: equal and storng bilaterally  Skin: Warm and dry, no jaundice or rash  Neuro: Alert & oriented x 3, Cns 2-12 intact, moves all extremities equally  Psych: Nervous    Data:  CMP  Recent Labs  Lab 03/23/18  0816      POTASSIUM 4.0   CHLORIDE 104   CO2 25   ANIONGAP 8   *   BUN 12   CR 0.74   GFRESTIMATED >90   GFRESTBLACK >90   OSMAR 8.2*     CBC  Recent Labs  Lab 03/23/18  0816   WBC 5.6   RBC 5.04   HGB 15.1   HCT 44.7   MCV 89   MCH 30.0   MCHC 33.8   RDW 12.7          Lab Results   Component Value Date    TROPI 0.117 (H) 03/23/2018    TROPONIN 0.07 03/23/2018         EKG: SR, no access deviations, no ST shifts, rates WNL.      Assessment/ Plan: Rodrigo Wallace is a 57 year old male PMHx history of previous NSTEMI s/p angioplasty in [place] who presents with chest pain and is found to have an elevated Troponin.    # Hx of MI s/p angioplasty  # NSTEMI- Initial Troponin 0.1. EKG SR without ST shifts. discussed with patient usual course for elevated Troponin's with chest pain and no other clear etiology. Patient agreeable to coronary angiogram but slightly apprehensive to receive a stent, believes he has been told in the past he only should have angioplasty? Discussed at length superiority and evidence supporting stenting if a lesion or narrowing is found, however, emphasized that this is always ultimately the patient's choice and we can opt to medically manage him as well. Patient  will think about it and make a decision after the next Troponin level and echocardiogram results come back. Currently chest pain free.  -- Trend Troponin's to peak  -- Initiate Heparin infusion  -- Nitro SL PRN and gtt if pain returns  -- Obtain echocardiogram to evaluate LV function and r/o WMA  -- Possible cor angio today vs medically optimizing patient. He is not currently on any medications. Will obtain Lipid panel, as well.   -- Tele  -- Initiated daily asa, low dose metop, Atorva, Ace-I.     VENKATA Sahu, CNP  [facility] [place]  Interventional Cardiology-CSI Service  Pager [phone]

## 2018-03-23 NOTE — ED NOTES
Pt has been pain free after nitro .4. He is up to BR voiding . Heparin infusing VSS. Report given to Charge RN at [facility]. Report given to transport RN

## 2018-03-24 VITALS
OXYGEN SATURATION: 99 % | RESPIRATION RATE: 16 BRPM | HEART RATE: 67 BPM | DIASTOLIC BLOOD PRESSURE: 97 MMHG | SYSTOLIC BLOOD PRESSURE: 134 MMHG | WEIGHT: 146.3 LBS | TEMPERATURE: 98.3 F

## 2018-03-24 LAB
ANION GAP SERPL CALCULATED.3IONS-SCNC: 10 MMOL/L (ref 3–14)
BUN SERPL-MCNC: 13 MG/DL (ref 7–30)
CALCIUM SERPL-MCNC: 8.7 MG/DL (ref 8.5–10.1)
CHLORIDE SERPL-SCNC: 104 MMOL/L (ref 94–109)
CHOLEST SERPL-MCNC: 240 MG/DL
CO2 SERPL-SCNC: 22 MMOL/L (ref 20–32)
CREAT SERPL-MCNC: 0.72 MG/DL (ref 0.66–1.25)
ERYTHROCYTE [DISTWIDTH] IN BLOOD BY AUTOMATED COUNT: 13.1 % (ref 10–15)
GFR SERPL CREATININE-BSD FRML MDRD: >90 ML/MIN/1.7M2
GLUCOSE SERPL-MCNC: 86 MG/DL (ref 70–99)
HCT VFR BLD AUTO: 48 % (ref 40–53)
HDLC SERPL-MCNC: 37 MG/DL
HGB BLD-MCNC: 16.1 G/DL (ref 13.3–17.7)
LDLC SERPL CALC-MCNC: 175 MG/DL
LMWH PPP CHRO-ACNC: <0.1 IU/ML
MCH RBC QN AUTO: 30.1 PG (ref 26.5–33)
MCHC RBC AUTO-ENTMCNC: 33.5 G/DL (ref 31.5–36.5)
MCV RBC AUTO: 90 FL (ref 78–100)
NONHDLC SERPL-MCNC: 203 MG/DL
PLATELET # BLD AUTO: 198 10E9/L (ref 150–450)
POTASSIUM SERPL-SCNC: 4.2 MMOL/L (ref 3.4–5.3)
RBC # BLD AUTO: 5.34 10E12/L (ref 4.4–5.9)
SODIUM SERPL-SCNC: 136 MMOL/L (ref 133–144)
TRIGL SERPL-MCNC: 141 MG/DL
TROPONIN I SERPL-MCNC: 0.95 UG/L (ref 0–0.04)
TROPONIN I SERPL-MCNC: 1.52 UG/L (ref 0–0.04)
WBC # BLD AUTO: 6.6 10E9/L (ref 4–11)

## 2018-03-24 PROCEDURE — 36415 COLL VENOUS BLD VENIPUNCTURE: CPT | Performed by: INTERNAL MEDICINE

## 2018-03-24 PROCEDURE — 80048 BASIC METABOLIC PNL TOTAL CA: CPT | Performed by: INTERNAL MEDICINE

## 2018-03-24 PROCEDURE — 84484 ASSAY OF TROPONIN QUANT: CPT | Performed by: INTERNAL MEDICINE

## 2018-03-24 PROCEDURE — 25000132 ZZH RX MED GY IP 250 OP 250 PS 637: Performed by: STUDENT IN AN ORGANIZED HEALTH CARE EDUCATION/TRAINING PROGRAM

## 2018-03-24 PROCEDURE — 25000132 ZZH RX MED GY IP 250 OP 250 PS 637: Performed by: INTERNAL MEDICINE

## 2018-03-24 PROCEDURE — 99239 HOSP IP/OBS DSCHRG MGMT >30: CPT | Mod: GC | Performed by: INTERNAL MEDICINE

## 2018-03-24 PROCEDURE — 80061 LIPID PANEL: CPT | Performed by: INTERNAL MEDICINE

## 2018-03-24 PROCEDURE — 25000128 H RX IP 250 OP 636: Performed by: INTERNAL MEDICINE

## 2018-03-24 PROCEDURE — 90686 IIV4 VACC NO PRSV 0.5 ML IM: CPT | Performed by: INTERNAL MEDICINE

## 2018-03-24 PROCEDURE — 85027 COMPLETE CBC AUTOMATED: CPT | Performed by: INTERNAL MEDICINE

## 2018-03-24 PROCEDURE — 85520 HEPARIN ASSAY: CPT | Performed by: INTERNAL MEDICINE

## 2018-03-24 RX ORDER — METOPROLOL TARTRATE 25 MG/1
12.5 TABLET, FILM COATED ORAL 2 TIMES DAILY
Qty: 90 TABLET | Refills: 3 | Status: SHIPPED | OUTPATIENT
Start: 2018-03-24

## 2018-03-24 RX ORDER — ATORVASTATIN CALCIUM 80 MG/1
80 TABLET, FILM COATED ORAL DAILY
Qty: 90 TABLET | Refills: 3 | Status: SHIPPED | OUTPATIENT
Start: 2018-03-25

## 2018-03-24 RX ADMIN — ATORVASTATIN CALCIUM 80 MG: 80 TABLET, FILM COATED ORAL at 07:56

## 2018-03-24 RX ADMIN — INFLUENZA A VIRUS A/MICHIGAN/45/2015 X-275 (H1N1) ANTIGEN (FORMALDEHYDE INACTIVATED), INFLUENZA A VIRUS A/HONG KONG/4801/2014 X-263B (H3N2) ANTIGEN (FORMALDEHYDE INACTIVATED), INFLUENZA B VIRUS B/PHUKET/3073/2013 ANTIGEN (FORMALDEHYDE INACTIVATED), AND INFLUENZA B VIRUS B/BRISBANE/60/2008 ANTIGEN (FORMALDEHYDE INACTIVATED) 0.5 ML: 15; 15; 15; 15 INJECTION, SUSPENSION INTRAMUSCULAR at 10:55

## 2018-03-24 RX ADMIN — ACETAMINOPHEN 650 MG: 325 TABLET, FILM COATED ORAL at 08:02

## 2018-03-24 RX ADMIN — ASPIRIN 81 MG: 81 TABLET, COATED ORAL at 07:57

## 2018-03-24 RX ADMIN — METOPROLOL TARTRATE 12.5 MG: 25 TABLET, FILM COATED ORAL at 07:57

## 2018-03-24 RX ADMIN — TICAGRELOR 90 MG: 90 TABLET ORAL at 07:56

## 2018-03-24 NOTE — DISCHARGE SUMMARY
[facility]  Discharge Summary     Rodrigo Wallace MRN# [id number]   YOB: 1960 Age: 57 year old     Admission Date: 3/23/2018  Discharge Date: 3/24/2018    Discharge Diagnoses:  1. NSTEMI      Imaging:  Transthoracic Echocardiogram, 3/23/2018:  Global and regional left ventricular function is normal with an EF of 60-65%.  Biplane LVEF measured at 62%. Left ventricular diastolic function is normal.  The right ventricle is normal size. Global right ventricular function is  normal.  Pulmonary artery systolic pressure cannot be assessed.  The inferior vena cava was normal in size with preserved respiratory  variability.  Estimated mean right atrial pressure is 3 mmHg.  No significant valvular dysfunction noted.  No pericardial effusion is present.    Procedures:  1. Coronary Angiogram, 3/23/2018:  1. Both coronary arteries arise from their respective cusps.  2. Dominance: Right  3. The LM is without angiographic evidence of disease.   4. LAD: Type 3 [LAD supplies the entire apex].. The LAD gives rise to septal perforators, a large D1 and D2.  The pLAD has a 95% stenosis with thrombus within lesion, starting after first septal and ending before first diagonal, The rest of the LAD had mild luminal irregularities.    5. LCX gives rise to medium size OM1 and OM2 vessels.  The mLCx has a 30% stenosis just after OM2.  OM1 and OM2 had luminal irregularities.    6. RCA gives rise to 3PL branches and supplies PDA. The pRCA to mRCA had sequential 10-20% stenosis,  RPDA has a 85% calcified and thrombotic lesion.      Consults:  None    HPI (adapted from admission H&P)  Rodrigo Wallace is a 57 year old male PMHx history of previous NSTEMI s/p angioplasty in 2008, ([place] of [place]) and former smoker who presents with chest pain. Chest pain is described as a pressure. It started over the last six days, and has become worse and more frequent. Pain is central to left lateral  without radiation. It is unrelated to breathing or eating. It is exacerbated with exertion and relieved with rest. Patient reports that this is not similar to his previous MI in 2008. He did feel nauseous and diaphoretic over the last week but not currently. He is currently chest pain free. He denies any SOB, palpitations, recent illnesses or fevers, light headedness, N/V/D. On arrival to ED he was found to have an elevated Troponin. He is without other significant medical hx.    Brief Hospital Course  Patient was taken to the cath lab and underwent coronary angiogram which showed obstructive disease in the proximal LAD,  pRCA and rPDA, to which he received VIRAL. He tolerated the procedure well. He was monitored overnight and remained chest pain free. He was discharged the follow-up morning and will follow-up with PCP in 1-2 weeks and with Dr. Crump in cardiology clinic in 1 month. He was discharged on ASA, Ticagrelor 90 mg BID, Atorvastatin 80 mg, and Metoprolol 12.5 mg BID.    Discharge Information  Discharge diet: Low fat, low salt (less than 2000 mg of salt daily)  Discharge activity:  Activity as tolerated  Disposition:  Discharged to home  Discharge weight: 146 lbs  Discharge creatinine: 0.72    Medication Changes  Start Atorvastatin 80 mg daily  Start Ticagrelor 90 mg BID  Start Metoprolol Tartrate 12.5 mg BID    Discharge Medications  Discharge Medication List as of 3/24/2018 12:33 PM      START taking these medications    Details   atorvastatin (LIPITOR) 80 MG tablet Take 1 tablet (80 mg) by mouth daily, Disp-90 tablet, R-3, E-Prescribe      metoprolol tartrate (LOPRESSOR) 25 MG tablet Take 0.5 tablets (12.5 mg) by mouth 2 times daily, Disp-90 tablet, R-3, E-Prescribe      ticagrelor (BRILINTA) 90 MG tablet Take 1 tablet (90 mg) by mouth 2 times daily, Disp-120 tablet, R-3, E-Prescribe         CONTINUE these medications which have NOT CHANGED    Details   aspirin (ASPIRIN 81) 81 MG chewable tablet Take  162-243 mg by mouth daily, Historical             Discharge Follow-up  PCP in 1-2 weeks  Dr. Crump in 1 month    Labs/imaging:     Code Status  FULL    CC  Patient Care Team:  No Ref-Primary, Physician as PCP - General        I have seen and examined the patient with the CSI team. I agree with the assessment and plan of the discharge summary note above.I have reviewed pertinent labs. More than 30 minutes were spent organizing the patient's discharge.    Epi Crump MD  Interventional Cardiology  Pager: [phone]

## 2018-03-24 NOTE — PLAN OF CARE
Problem: Patient Care Overview  Goal: Plan of Care/Patient Progress Review  Outcome: Improving  D: Pt admitted w/ CP s/p VIRAL x4 3/23.  Brief episode of CP x1 on jd, resolved spontaneously w/in 5 min, MD notified.  I: TR band deflated, removed.  EKG x1.  A: Pt denies pain overnight besides brief episode of CP.  VSS, see flowsheet.  SR.  Independent.  Voiding/stooling in bathroom.  Sleeping b/w cares.  P: Continue to monitor and notify MDs as necessary of pertinent pt condition changes.  Likely d/c today.

## 2018-03-24 NOTE — PROGRESS NOTES
D/c to home. PIV removed. Reviewed d/c instructions with pt. Pt verbalized understanding of d/c instructions and new medications. Walked to d/c pharm and then front door. Ride waiting at front door.

## 2018-03-26 ENCOUNTER — CARE COORDINATION (OUTPATIENT)
Dept: CARE COORDINATION | Facility: CLINIC | Age: 58
End: 2018-03-26

## 2018-03-26 LAB — INTERPRETATION ECG - MUSE: NORMAL

## 2018-04-12 ENCOUNTER — HOSPITAL ENCOUNTER (OUTPATIENT)
Dept: CARDIAC REHAB | Facility: CLINIC | Age: 58
End: 2018-04-12
Attending: INTERNAL MEDICINE
Payer: COMMERCIAL

## 2018-04-12 VITALS — WEIGHT: 149 LBS | HEIGHT: 66 IN | BODY MASS INDEX: 23.95 KG/M2

## 2018-04-12 DIAGNOSIS — I21.4 NSTEMI (NON-ST ELEVATED MYOCARDIAL INFARCTION) (H): ICD-10-CM

## 2018-04-12 PROCEDURE — 93797 PHYS/QHP OP CAR RHAB WO ECG: CPT

## 2018-04-12 PROCEDURE — 93798 PHYS/QHP OP CAR RHAB W/ECG: CPT

## 2018-04-12 PROCEDURE — 40000575 ZZH STATISTIC OP CARDIAC VISIT #2

## 2018-04-12 PROCEDURE — 40000116 ZZH STATISTIC OP CR VISIT

## 2018-04-12 ASSESSMENT — 6 MINUTE WALK TEST (6MWT)
FEMALE CALC: 1758.62
TOTAL DISTANCE WALKED (FT): 1797
PREDICTED: 1830.49
MALE CALC: 1819.4

## 2018-04-12 NOTE — PROGRESS NOTES
Physician cosignature/electronic signature indicates approval of this ITP document. I have established, reviewed and made necessary changes to the individualized treatment plan and exercise prescription for this patient.       04/12/18 0900   Session   Session Initial Evaluation and Exercise Prescription   Certified through this date 05/07/18   Cardiac Rehab Assessment   Cardiac Rehab Assessment Pt presented to hospital with chesp pain and had NSTEMI. Pt preceded to have 4x Stents and has previous history of MI in 2008. Main risk factor appears to be high cholesterol. He has a remote history of smoking, quit in 2003. Pt is reluctent to schedule cardiac rehab sessions due to schedule, but is willing to initiate rehab for first few weeks.     The patient's history and clinical status including hemodynamics and ECG were evaluated. The patient was assessed to be stable and appropriate to begin exercise.   The patient's functional capacity and exercise prescription were determined by the completion of the 6 minute walk test. See results above. The patient was oriented to the program.  Risk factor profile was completed. Goals and objectives were discussed. CV response was WNL. No symptoms, complaints or pain were reported. Good prognosis for reaching goals below. Skilled therapy is necessary in order to monitor CV response to exercise, to provide education on risk factors and behavior change counseling needed to achieve patient's goals.  Plan to progress to 30-40 minutes of exercise prior to discharge from cardiac rehab.  Initial THR of 20-30 beats above RHR; Effort rating of 4-6. Initiate muscle conditioning as appropriate. Provide risk factor education and behavior change counseling.    General Information   Treatment Diagnosis NSTEMI   Date of Treatment Diagnosis 03/23/18   Secondary Treatment Diagnosis Stent   Significant Past CV History Previous PCI   Comorbidities Previous MI   Other Medical History Stent in 2008  "  Lead up symptoms Fatigue, Chest pain   Hospital Discharge Date 03/24/18   Signs and Symptoms Post Hospital Discharge None   Outpatient Cardiac Rehab Start Date 04/12/18   Primary Physician Follow Up Advised to schedule appointment   Cardiologist Alisia   Cardiologist Follow Up Advised to schedule appointment   Ejection Fraction 60-65%   Risk Stratification Low   Living and Work Status    Support System Live with an adult   Return to Employment Yes   Occupation [employer]   Preventative Medications   CMS recommended medications Antiplatelets;Beta Blocker;Influenza vaccination;Lipid Lowering   Fall Risk Screen   Fall screen completed by Cardiac Rehab   Have you fallen 2 or more times in the past year? No   Have you fallen and had an injury in the past year? No   Is patient a fall risk? No   Pain   Patient Currently in Pain Denies   Physical Assessments   Incisions Not applicable   Edema None   Right Lung Sounds not assessed   Left Lung Sounds not assessed   Limitations No limitations   Individualized Treatment Plan   Monitored Sessions Scheduled 24   Monitored Sessions Attended 1   Oxygen   Supplemental Oxygen needed No   Nutrition Management - Weight Management   Assessment Initial Assessment   Age 57   Weight 67.6 kg (149 lb)   Height 1.676 m (5' 6\")   BMI (Calculated) 24.1   Initial Rate Your Plate Score. Dietary tool to assess eating patterns. Scores range from 24 to 72. The higher the score the healthier the eating pattern. 72   Nutrition Management - Lipids   Lipids Labs Available   Date 03/24/18   Total Cholesterol 240   HDL 37      Prescribed Lipid Medication Yes   Statin Intensity High Intensity   Nutrition Management - Diabetes   Diabetes No   Nutrition Management Summary   Dietary Recommendations Low Fat;Low Cholesterol   Stages of Change for Diet Compliance Action   Interventions Planned Attend Nutrition Education Class(es);Educate on Benefits of Exercise   Nutrition Summary Comments " Pt denies need for education   Psychosocial Management   Psychosocial Assessment Initial   Is there history of clinical depression or increased risk of depression? No previous history   Current Level of Stress per Patient Report Denies   Current Coping Skills Has Positive Support System;Uses Stress Management/Relaxation Techniques   Initial Patient Health Questionnaire -9 Score (PHQ-9) for depression. 5-9 Minimal symptoms, 10-14 Minor depression, 15-19 Major depression, moderately severe, > 20 Major depression, severe  0   Initial [facility] Survey score.  Quality of Life:   If total score > 25 review individual areas where patient rated a 4 or 5.  Consider patients current medical condition and what role that plays on the score.   Adjust treatment protocol to improve areas of concern.  Consider the following:  PHQ9 score, DASI, and re-assessment within the next 30 days to assist with developing treatments.  10   Stages of Change Maintenance   Interventions Planned Patient to verbalize understanding of behavioral assessment results;Patient to verbalize understanding of negative impact of stress to personal health;Patient will practice coping/stress management techniques;Patient to attend stress management class(es);Patient will recognize signs and symptoms of depression   Psychosocial Comments Pt denies any stressors and reports adequet response to stressors   Other Core Components - Hypertension   History of or Diagnosis of Hypertension Yes   Currently taking Anti-Hypertensives Beta blocker   Other Core Components - Tobacco   History of Tobacco Use Yes   Quit Date or Planned Quit Date 01/01/03   Tobacco Use Status Former (Quit > 6 mo ago)   Stages of Change Maintenance   Other Core Components Summary   Interventions Planned Attend education class(es) on Nutrition;Check-in regularly, offer support to prevent risk of relapse   Activity/Exercise History   Activity/Exercise Assessment Initial   Activity/Exercise  Status prior to event? Was Physically Active   Number of Days Currently participating in Moderate Physical Activity? 7   Number of Days Currently performing  Aerobic Exercise (including rehab)? 4   Number of Minutes per Session Currently of Aerobic Exercise (average)? 30   Current Stage of Change (Physical Activity) Maintenance   Current Stage of Change (Aerobic Exercise) Preparation   Patient Goals Goal #1;Goal #2   Goal #1 Description Pt would like to establish ability to perform intermediate activity at a brisk pace of 3mph. with intermittend jogging bouts with stable cardiovascular response   Goal #1 Target Date 06/07/18   Goal #2 Description Pt would like to feel confident in estalblishing  independent exercise upon completion of cardiac rehab program   Goal #2 Target Date 06/07/18   Exercise Assessment   6 Minute Walk Predicted (Male) 1819.4   6 Minute Walk Predicted (Female) 1758.62   Initial 6 Minute Walk Distance (Feet) 1797 ft   Resting HR 63 bpm   Exercise  bpm   Post Exercise HR 67 bpm   Resting /52   Exercise /62   Post Exercise /58   Effort Rating 5   Current MET Level 3.6   MET Level Goal 6-8   ECG Rhythm Sinus rhythm   Ectopy None   Current Symptoms Denies symptoms   Limitations/Restrictions None   Exercise Prescription   Mode Treadmill;Elliptical;LE strengthening   Duration/Time 30-45 min   Frequency 3 daysweek   THR (85% of age predicted max HR) 138.55   OMNI Effort Rating (0-10 Scale) 4-6/10   Progression Intermittent bouts;Progress peak intensity by 1/2 MET per week;Progress per high intensity interval training (HIIT) program   Recommended Home Exercise   Type of Exercise Walking   Frequency (days per week) Daily on Non-Rehab days   Duration (minutes per session) 30-45 min   Effort Rating Recommended 4-6/10   30 Day Exercise Plan Establish regular exercise routine per ability of patient; Progress home exercise as tolerated in cardiac rehab   Current Home Exercise   Type of  Exercise Weights   Frequency (days per week) 4   Duration (minutes per session) 30   Follow-up/On-going Support   Provider follow-up needed on the following Other (see comments)   Comments Pt need to establish care with cardiology and establish appointment   Learning Assessment   Learner Patient   Primary Language Cypriot   Preferred Learning Style Listening;Reading;Demonstration;Pictures/Video   Patient Education   Education recommended Exercise Principles;Medication Overview;Nutrition

## 2018-04-13 ENCOUNTER — HOSPITAL ENCOUNTER (OUTPATIENT)
Dept: CARDIAC REHAB | Facility: CLINIC | Age: 58
End: 2018-04-13
Attending: INTERNAL MEDICINE
Payer: COMMERCIAL

## 2018-04-13 PROCEDURE — 93798 PHYS/QHP OP CAR RHAB W/ECG: CPT | Performed by: OCCUPATIONAL THERAPIST

## 2018-04-13 PROCEDURE — 40000116 ZZH STATISTIC OP CR VISIT: Performed by: OCCUPATIONAL THERAPIST

## 2018-04-26 NOTE — ADDENDUM NOTE
Encounter addended by: Kyle Perez on: 4/26/2018 12:19 PM<BR>     Actions taken: Sign clinical note, Episode resolved

## 2018-04-26 NOTE — PROGRESS NOTES
Cardiac Rehab Discharge Summary    Reason for discharge:    Patient only attended 2 cardiac rehab sessions, with multiple cancel no shows. He reported being back to work for 10 hour days on his last visit.     Progress towards goals:  Patient only attended 2 exercise sessions. He was exercising on the treadmill at 3.6 METs and on the Nustep at 3.8 METs.    Recommendation(s):    Continued skilled therapy is recommended to monitor stable CV response to exercise and provide education on risk factors as well as starting a home exercise program.

## 2019-04-17 NOTE — PROGRESS NOTES
Patient was called three times and no answer so post 24 hr DC follow up calls will be closed out           Universal Safety Interventions

## 2019-05-14 ENCOUNTER — HOSPITAL ENCOUNTER (EMERGENCY)
Facility: CLINIC | Age: 59
Discharge: HOME | End: 2019-05-14
Attending: EMERGENCY MEDICINE | Admitting: EMERGENCY MEDICINE
Payer: COMMERCIAL

## 2019-05-14 VITALS
HEART RATE: 108 BPM | DIASTOLIC BLOOD PRESSURE: 81 MMHG | TEMPERATURE: 96.1 F | RESPIRATION RATE: 16 BRPM | SYSTOLIC BLOOD PRESSURE: 133 MMHG | OXYGEN SATURATION: 100 %

## 2019-05-14 DIAGNOSIS — L29.9 PRURITIC DISORDER: ICD-10-CM

## 2019-05-14 LAB
ALBUMIN SERPL-MCNC: 3.4 G/DL (ref 3.4–5)
ALBUMIN UR-MCNC: 10 MG/DL
ALP SERPL-CCNC: 78 U/L (ref 40–150)
ALT SERPL W P-5'-P-CCNC: 22 U/L (ref 0–70)
ANION GAP SERPL CALCULATED.3IONS-SCNC: 9 MMOL/L (ref 3–14)
APPEARANCE UR: CLEAR
AST SERPL W P-5'-P-CCNC: 31 U/L (ref 0–45)
BASOPHILS # BLD AUTO: 0 10E9/L (ref 0–0.2)
BASOPHILS NFR BLD AUTO: 0.1 %
BILIRUB SERPL-MCNC: 0.7 MG/DL (ref 0.2–1.3)
BILIRUB UR QL STRIP: NEGATIVE
BUN SERPL-MCNC: 24 MG/DL (ref 7–30)
CALCIUM SERPL-MCNC: 8.2 MG/DL (ref 8.5–10.1)
CHLORIDE SERPL-SCNC: 111 MMOL/L (ref 94–109)
CO2 SERPL-SCNC: 23 MMOL/L (ref 20–32)
COLOR UR AUTO: YELLOW
CREAT SERPL-MCNC: 0.89 MG/DL (ref 0.66–1.25)
DIFFERENTIAL METHOD BLD: ABNORMAL
EOSINOPHIL # BLD AUTO: 0 10E9/L (ref 0–0.7)
EOSINOPHIL NFR BLD AUTO: 0.3 %
ERYTHROCYTE [DISTWIDTH] IN BLOOD BY AUTOMATED COUNT: 12.6 % (ref 10–15)
GFR SERPL CREATININE-BSD FRML MDRD: >90 ML/MIN/{1.73_M2}
GLUCOSE SERPL-MCNC: 93 MG/DL (ref 70–99)
GLUCOSE UR STRIP-MCNC: NEGATIVE MG/DL
HCT VFR BLD AUTO: 42.5 % (ref 40–53)
HGB BLD-MCNC: 14.2 G/DL (ref 13.3–17.7)
HGB UR QL STRIP: NEGATIVE
HYALINE CASTS #/AREA URNS LPF: 1 /LPF (ref 0–2)
IMM GRANULOCYTES # BLD: 0.1 10E9/L (ref 0–0.4)
IMM GRANULOCYTES NFR BLD: 0.4 %
KETONES UR STRIP-MCNC: 5 MG/DL
LEUKOCYTE ESTERASE UR QL STRIP: NEGATIVE
LYMPHOCYTES # BLD AUTO: 0.9 10E9/L (ref 0.8–5.3)
LYMPHOCYTES NFR BLD AUTO: 7.6 %
MCH RBC QN AUTO: 29.3 PG (ref 26.5–33)
MCHC RBC AUTO-ENTMCNC: 33.4 G/DL (ref 31.5–36.5)
MCV RBC AUTO: 88 FL (ref 78–100)
MONOCYTES # BLD AUTO: 0.7 10E9/L (ref 0–1.3)
MONOCYTES NFR BLD AUTO: 5.9 %
MUCOUS THREADS #/AREA URNS LPF: PRESENT /LPF
NEUTROPHILS # BLD AUTO: 9.8 10E9/L (ref 1.6–8.3)
NEUTROPHILS NFR BLD AUTO: 85.7 %
NITRATE UR QL: NEGATIVE
NRBC # BLD AUTO: 0 10*3/UL
NRBC BLD AUTO-RTO: 0 /100
PH UR STRIP: 5.5 PH (ref 5–7)
PLATELET # BLD AUTO: 248 10E9/L (ref 150–450)
POTASSIUM SERPL-SCNC: 3.2 MMOL/L (ref 3.4–5.3)
PROT SERPL-MCNC: 7.1 G/DL (ref 6.8–8.8)
RBC # BLD AUTO: 4.84 10E12/L (ref 4.4–5.9)
RBC #/AREA URNS AUTO: 0 /HPF (ref 0–2)
SODIUM SERPL-SCNC: 143 MMOL/L (ref 133–144)
SOURCE: ABNORMAL
SP GR UR STRIP: 1.03 (ref 1–1.03)
SQUAMOUS #/AREA URNS AUTO: <1 /HPF (ref 0–1)
UROBILINOGEN UR STRIP-MCNC: NORMAL MG/DL (ref 0–2)
WBC # BLD AUTO: 11.4 10E9/L (ref 4–11)
WBC #/AREA URNS AUTO: 1 /HPF (ref 0–5)

## 2019-05-14 PROCEDURE — 25000128 H RX IP 250 OP 636: Performed by: EMERGENCY MEDICINE

## 2019-05-14 PROCEDURE — 81001 URINALYSIS AUTO W/SCOPE: CPT | Performed by: EMERGENCY MEDICINE

## 2019-05-14 PROCEDURE — 96374 THER/PROPH/DIAG INJ IV PUSH: CPT | Performed by: EMERGENCY MEDICINE

## 2019-05-14 PROCEDURE — 99284 EMERGENCY DEPT VISIT MOD MDM: CPT | Mod: 25 | Performed by: EMERGENCY MEDICINE

## 2019-05-14 PROCEDURE — 25800030 ZZH RX IP 258 OP 636

## 2019-05-14 PROCEDURE — 80053 COMPREHEN METABOLIC PANEL: CPT | Performed by: EMERGENCY MEDICINE

## 2019-05-14 PROCEDURE — 85025 COMPLETE CBC W/AUTO DIFF WBC: CPT | Performed by: EMERGENCY MEDICINE

## 2019-05-14 PROCEDURE — 99284 EMERGENCY DEPT VISIT MOD MDM: CPT | Mod: Z6 | Performed by: EMERGENCY MEDICINE

## 2019-05-14 PROCEDURE — 96361 HYDRATE IV INFUSION ADD-ON: CPT | Performed by: EMERGENCY MEDICINE

## 2019-05-14 RX ORDER — DIPHENHYDRAMINE HCL 25 MG
25-50 CAPSULE ORAL EVERY 6 HOURS PRN
Qty: 60 CAPSULE | Refills: 0 | Status: SHIPPED | OUTPATIENT
Start: 2019-05-14

## 2019-05-14 RX ORDER — DIPHENHYDRAMINE HYDROCHLORIDE 50 MG/ML
25 INJECTION INTRAMUSCULAR; INTRAVENOUS ONCE
Status: COMPLETED | OUTPATIENT
Start: 2019-05-14 | End: 2019-05-14

## 2019-05-14 RX ORDER — SODIUM CHLORIDE 9 MG/ML
1000 INJECTION, SOLUTION INTRAVENOUS CONTINUOUS
Status: DISCONTINUED | OUTPATIENT
Start: 2019-05-14 | End: 2019-05-14 | Stop reason: HOSPADM

## 2019-05-14 RX ORDER — DIPHENHYDRAMINE HCL 50 MG
50 CAPSULE ORAL ONCE
Status: DISCONTINUED | OUTPATIENT
Start: 2019-05-14 | End: 2019-05-14

## 2019-05-14 RX ORDER — SODIUM CHLORIDE 9 MG/ML
INJECTION, SOLUTION INTRAVENOUS
Status: COMPLETED
Start: 2019-05-14 | End: 2019-05-14

## 2019-05-14 RX ADMIN — DIPHENHYDRAMINE HYDROCHLORIDE 25 MG: 50 INJECTION, SOLUTION INTRAMUSCULAR; INTRAVENOUS at 15:56

## 2019-05-14 RX ADMIN — Medication 1000 ML: at 15:55

## 2019-05-14 RX ADMIN — SODIUM CHLORIDE 1000 ML: 9 INJECTION, SOLUTION INTRAVENOUS at 15:55

## 2019-05-14 ASSESSMENT — ENCOUNTER SYMPTOMS
DYSURIA: 0
FATIGUE: 1
WOUND: 0
VOMITING: 0
HEADACHES: 0
NUMBNESS: 0
NAUSEA: 0
SHORTNESS OF BREATH: 0

## 2019-05-14 NOTE — ED AVS SNAPSHOT
[facility], [place], Emergency Department  [address]  [facility] [place] [address]  Phone:  [phone]  Fax:  [phone]                                    Rodrigo Wallace   MRN: [id number]    Department:  [facility], Emergency Department   Date of Visit:  5/14/2019           After Visit Summary Signature Page    I have received my discharge instructions, and my questions have been answered. I have discussed any challenges I see with this plan with the nurse or doctor.    ..........................................................................................................................................  Patient/Patient Representative Signature      ..........................................................................................................................................  Patient Representative Print Name and Relationship to Patient    ..................................................               ................................................  Date                                   Time    ..........................................................................................................................................  Reviewed by Signature/Title    ...................................................              ..............................................  Date                                               Time          22EPIC Rev 08/18

## 2019-05-14 NOTE — ED PROVIDER NOTES
[facility] EMERGENCY DEPARTMENT ([facility])    5/14/19        History     Chief Complaint   Patient presents with     Allergic Reaction     Pt reports SOB and severe itching began last night, no known allergies, pt has been fasting 9 days      HPI  Rodrigo Wallace is a 58 year old male with a past medical history significant for previous NSTEMI s/p angioplasty (2008) & angiogram (3/2018) who presents to the Emergency Department today due to diffuse itching. Patient reports that yesterday at 12 PM while he was at work he began to experience itching in his hands and arms. Of note patient works in a warehouse. Patient took an Allegra and this caused his symptoms to disappear. However, patient woke up this morning and his symptoms had returned but this time it was diffusely across his body. He did not use an Allegra but has been using creams to help. He has had similar episodes in 2016 & 2008 that was resolved with steroids. He denies any changes in his diet, change at work, any new soaps or detergents. He is currently fasting for Ramadan. He reports no one at home are experiencing these same symptoms. He denies headaches, chest pain, shortness of breath, numbness, tingling or dysuria. He is currently experiencing fatigue but notes he believes this may be due to itching all day.    I have reviewed the Medications, Allergies, Past Medical and Surgical History, and Social History in the [employer] system.    Past Medical History:   Diagnosis Date     High cholesterol      Myocardial infarction 2008    States he has 1 sent placed.       Past Surgical History:   Procedure Laterality Date     CARDIAC SURGERY  2008    Stent place through right groin       No family history on file.    Social History     Tobacco Use     Smoking status: Former Smoker     Smokeless tobacco: Never Used     Tobacco comment: Quit smoking 2003   Substance Use Topics     Alcohol use: No       Current Facility-Administered Medications    Medication     0.9% sodium chloride BOLUS    Followed by     sodium chloride 0.9% infusion     diphenhydrAMINE (BENADRYL) capsule 50 mg     Current Outpatient Medications   Medication     aspirin (ASPIRIN 81) 81 MG chewable tablet     atorvastatin (LIPITOR) 80 MG tablet     metoprolol tartrate (LOPRESSOR) 25 MG tablet     ticagrelor (BRILINTA) 90 MG tablet      No Known Allergies      Review of Systems   Constitutional: Positive for fatigue.   Respiratory: Negative for shortness of breath.    Cardiovascular: Negative for chest pain.   Gastrointestinal: Negative for nausea and vomiting.   Genitourinary: Negative for dysuria.   Skin: Negative for rash and wound.        Positive for diffuse itching   Neurological: Negative for numbness and headaches.   All other systems reviewed and are negative.      Physical Exam   BP: 113/61  Heart Rate: 122  Temp: 96.1  F (35.6  C)  SpO2: 96 %      Physical Exam   Constitutional: No distress.   HENT:   Head: Atraumatic.   Mouth/Throat: Oropharynx is clear and moist.   Eyes: Pupils are equal, round, and reactive to light. No scleral icterus.   Cardiovascular: Normal heart sounds and intact distal pulses.   Pulmonary/Chest: Breath sounds normal. No respiratory distress.   Abdominal: Soft. Bowel sounds are normal. There is no tenderness.   Musculoskeletal: He exhibits no edema or tenderness.   Skin: Skin is warm. No rash noted. He is not diaphoretic.       ED Course        Procedures             Critical Care time:  none             Labs Ordered and Resulted from Time of ED Arrival Up to the Time of Departure from the ED - No data to display         Assessments & Plan (with Medical Decision Making)     58 year old male with a past medical history significant for previous NSTEMI s/p angioplasty (2008) & angiogram (3/2018) who presents to the Emergency Department today due to diffuse itching.  IV established, labs drawn sent reviewed and documented in epic with normal CBC  electrolytes.  Patient administered 25 mg Benadryl IV along with 1 L of normal saline IV fluid bolus.  Upon repeat assessment patient symptoms completely resolved.  Plan for outpatient follow-up with primary care provider for further evaluation and care.  Patient given prescription for Benadryl to manage symptoms at home as needed.    I have reviewed the nursing notes.    I have reviewed the findings, diagnosis, plan and need for follow up with the patient.       Medication List      There are no discharge medications for this visit.         Final diagnoses:   Pruritic disorder     IJohn, am serving as a trained medical scribe to document services personally performed by Sunita Mitchell MD, based on the provider's statements to me.   ISunita MD, was physically present and have reviewed and verified the accuracy of this note documented by John Shaikh.    5/14/2019   [facility], [place], EMERGENCY DEPARTMENT     Sunita Mitchell MD  05/20/19 0121

## 2019-05-14 NOTE — ED NOTES
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 Took allegra which he states helped with itching. Did not itch at home, or anytime before returing to work, when itching returned. Has been working there for 2 years and this has not happened before. Denies exposure to any chemicals that he is aware of.

## 2019-05-14 NOTE — DISCHARGE INSTRUCTIONS
Please follow up with the Allergy Specialist. Continue taking your zyrtek to feel better. If the itching returns with the zyrtek , then add the Benadryl as needed.

## 2019-10-22 ENCOUNTER — HOSPITAL ENCOUNTER (EMERGENCY)
Facility: CLINIC | Age: 59
Discharge: HOME | End: 2019-10-22
Attending: EMERGENCY MEDICINE | Admitting: EMERGENCY MEDICINE
Payer: COMMERCIAL

## 2019-10-22 ENCOUNTER — APPOINTMENT (OUTPATIENT)
Dept: GENERAL RADIOLOGY | Facility: CLINIC | Age: 59
End: 2019-10-22
Attending: EMERGENCY MEDICINE
Payer: COMMERCIAL

## 2019-10-22 VITALS
HEART RATE: 69 BPM | TEMPERATURE: 98.6 F | RESPIRATION RATE: 20 BRPM | DIASTOLIC BLOOD PRESSURE: 75 MMHG | SYSTOLIC BLOOD PRESSURE: 134 MMHG | OXYGEN SATURATION: 99 % | BODY MASS INDEX: 22 KG/M2 | HEIGHT: 69 IN

## 2019-10-22 DIAGNOSIS — S43.005A SHOULDER DISLOCATION, LEFT, INITIAL ENCOUNTER: ICD-10-CM

## 2019-10-22 PROCEDURE — 99285 EMERGENCY DEPT VISIT HI MDM: CPT | Mod: 25 | Performed by: EMERGENCY MEDICINE

## 2019-10-22 PROCEDURE — 99152 MOD SED SAME PHYS/QHP 5/>YRS: CPT

## 2019-10-22 PROCEDURE — 40000986 XR SHOULDER LT PORT G/E 2 VW: Mod: LT

## 2019-10-22 PROCEDURE — 23650 CLTX SHO DSLC W/MNPJ WO ANES: CPT | Mod: LT

## 2019-10-22 PROCEDURE — 99285 EMERGENCY DEPT VISIT HI MDM: CPT | Mod: 25

## 2019-10-22 PROCEDURE — 73030 X-RAY EXAM OF SHOULDER: CPT | Mod: LT

## 2019-10-22 PROCEDURE — 25000128 H RX IP 250 OP 636: Performed by: EMERGENCY MEDICINE

## 2019-10-22 PROCEDURE — 99152 MOD SED SAME PHYS/QHP 5/>YRS: CPT | Mod: 59 | Performed by: EMERGENCY MEDICINE

## 2019-10-22 PROCEDURE — 23650 CLTX SHO DSLC W/MNPJ WO ANES: CPT | Mod: 54 | Performed by: EMERGENCY MEDICINE

## 2019-10-22 RX ORDER — FENTANYL CITRATE 50 UG/ML
50 INJECTION, SOLUTION INTRAMUSCULAR; INTRAVENOUS ONCE
Status: COMPLETED | OUTPATIENT
Start: 2019-10-22 | End: 2019-10-22

## 2019-10-22 RX ORDER — PROPOFOL 10 MG/ML
INJECTION, EMULSION INTRAVENOUS
Status: DISCONTINUED
Start: 2019-10-22 | End: 2019-10-22 | Stop reason: HOSPADM

## 2019-10-22 RX ORDER — PROPOFOL 10 MG/ML
75 INJECTION, EMULSION INTRAVENOUS ONCE
Status: COMPLETED | OUTPATIENT
Start: 2019-10-22 | End: 2019-10-22

## 2019-10-22 RX ADMIN — FENTANYL CITRATE 50 MCG: 50 INJECTION INTRAMUSCULAR; INTRAVENOUS at 06:10

## 2019-10-22 RX ADMIN — PROPOFOL 20 MG: 10 INJECTION, EMULSION INTRAVENOUS at 06:30

## 2019-10-22 RX ADMIN — PROPOFOL 75 MG: 10 INJECTION, EMULSION INTRAVENOUS at 06:24

## 2019-10-22 RX ADMIN — PROPOFOL 75 MG: 10 INJECTION, EMULSION INTRAVENOUS at 07:31

## 2019-10-22 NOTE — LETTER
October 22, 2019      To Whom It May Concern:      Rodrigo Wallace was seen in our Emergency Department today, 10/22/19. He requires a shoulder immobilizer of left arm and unable to use left arm currently.  He may return to work when follow up with orthopedics and improved.    Sincerely,        Car Parks MD

## 2019-10-22 NOTE — ED PROVIDER NOTES
"  History     Chief Complaint   Patient presents with     Fall     pt reported he was walking toward his car, he slipped and fell landed on his left shoulder     HPI  Rodrigo Wallace is a 59 year old male who has a past medical history significant for previous NSTEMI s/p angioplasty (2008) & angiogram (3/2018) who presents to the Emergency Department today with c/o of shoulder pain s/p fall.  Patient reports he was walking towards his car when he slipped and fell onto his left shoulder.  Patient denies hitting his head, no loss of consciousness, patient states that his shoulder is not in the right place, denies any other injuries, tingling, numbness.    I have reviewed the Medications, Allergies, Past Medical and Surgical History, and Social History in the Epic system.    Review of Systems   Musculoskeletal:        +left shoulder pain   All other systems reviewed and are negative.      Physical Exam   BP: (!) 140/89  Pulse: 68  Heart Rate: 60  Temp: 98  F (36.7  C)  Resp: 19  Height: 175.3 cm (5' 9\")  Weight: (pt refused secondary to pain)  SpO2: 98 %      Physical Exam  Constitutional:       General: He is not in acute distress.     Appearance: He is well-developed.   HENT:      Head: Normocephalic and atraumatic.      Right Ear: External ear normal.      Left Ear: External ear normal.   Eyes:      Conjunctiva/sclera: Conjunctivae normal.      Pupils: Pupils are equal, round, and reactive to light.   Neck:      Musculoskeletal: Normal range of motion and neck supple.   Cardiovascular:      Rate and Rhythm: Normal rate and regular rhythm.      Heart sounds: Normal heart sounds.   Pulmonary:      Effort: Pulmonary effort is normal. No respiratory distress.      Breath sounds: Normal breath sounds. No wheezing or rales.   Abdominal:      General: There is no distension.      Palpations: Abdomen is soft.      Tenderness: There is no tenderness. There is no guarding or rebound.   Musculoskeletal:         General: " Tenderness, deformity (left shoulder appears dislocated) and signs of injury present.   Skin:     General: Skin is warm and dry.      Findings: No rash.   Neurological:      Mental Status: He is alert and oriented to person, place, and time.      Cranial Nerves: No cranial nerve deficit.      Coordination: Coordination normal.   Psychiatric:         Behavior: Behavior normal.         ED Course     Shoulder Reduction:  Consent for reduction: Risks and benefits discussed with patient and verbal and written consent obtained    Dislocation reduction of the left shoulder    X-rays were obtained showing dislocation.  Joint was reduced under procedural sedation using propofol after 2 attempts. Return of normal alignment was successful with traction countertraction.  Patient was placed in shoulder immobilizer.    Postprocedure examination demonstrated no deformity, no tenderness, radial pulse present bilaterally, cap refill less than 3 seconds.  No motor or sensory deficit.      Complications:  The patient tolerated the procedure well without complications.    Labs Ordered and Resulted from Time of ED Arrival Up to the Time of Departure from the ED - No data to display  .[facility] Procedure Note        Sedation:      Performed by: Vero Hoskins MD  Authorized by: Vero Hoskins MD    Pre-Procedure Assessment done at 0600.    Sedation Level:  Moderate Sedation    Indication:  Sedation is required to allow for joint reduction    Consent obtained from patient after discussing the risks, benefits and alternatives.    PO Intake:  Not NPO but emergent condition outweighs risk.    ASA Class:  Class 1 - HEALTHY PATIENT    Mallampati:  Grade 1:  Soft palate, uvula, tonsillar pillars, and posterior pharyngeal wall visible    Lungs: Lungs Clear with good breath sounds bilaterally.     Heart: Normal heart sounds and rate    History and physical reviewed and no updates needed. I have reviewed the lab findings,  diagnostic data, medications, and the plan for sedation. I have determined this patient to be an appropriate candidate for the planned sedation and procedure and have reassessed the patient IMMEDIATELY PRIOR to sedation and procedure.      Sedation Post Procedure Summary:    Prior to the start of the procedure and with procedural staff participation, I verbally confirmed the patient s identity using two indicators, relevant allergies, that the procedure was appropriate and matched the consent or emergent situation, and that the correct equipment/implants were available. Immediately prior to starting the procedure I conducted the Time Out with the procedural staff and re-confirmed the patient s name, procedure, and site/side. (The Joint Commission universal protocol was followed.)  Yes      Sedatives: Propofol    Vital signs, airway, End Tidal CO2 and pulse oximetry were monitored and remained stable throughout the procedure and sedation was maintained until the procedure was complete.  The patient was monitored by staff until sedation discharge criteria were met.    Patient tolerance: Patient tolerated the procedure well with no immediate complications.    Time of sedation in minutes:  10 minutes from beginning to end of physician one to one monitoring.      Assessments & Plan (with Medical Decision Making)   Rodrigo Wallace is a 59 year old male who has a past medical history significant for previous NSTEMI s/p angioplasty (2008) & angiogram (3/2018) who presents to the Emergency Department today with c/o of shoulder pain s/p fall.  Upon arrival patient is well-appearing, afebrile, mild distress secondary to pain.  On examination left shoulder appears dislocated, radial pulse present bilaterally, no focal neurological deficit.  No other injuries.   Differential diagnosis includes but is not limited to shoulder dislocation versus fracture versus contusion among others.    I reviewed x-ray of the left shoulder  which demonstrates Anterior dislocation of the humeral head. No evidence of  Fracture. At this time I discussed results with patient and family, will plan for procedural sedation and relocation.    Patient sedated with propofol and after second attempt shoulder was successfully reduced, placed in shoulder immobilizer. Xray's pending. Patient signed out to morning provider.     I have reviewed the nursing notes.    I have reviewed the findings, diagnosis, plan and need for follow up with the patient.    New Prescriptions    No medications on file       Final diagnoses:   Shoulder dislocation, left, initial encounter       10/22/2019   [facility], [place], EMERGENCY DEPARTMENT     Vero Hoskins MD  10/22/19 2180

## 2019-10-22 NOTE — ED NOTES
Successful post reduction of left shoulder accomplished in the ER.  Refer to Dr. Hoskins's note.  Patient observed for period time is clear now vitals are stable does not require O2 is feeling fine at this point appears baseline.  At this point will place a follow-up referral with orthopedics at the [facility] in the meantime patient will use a shoulder immobilizer.  Ice a note for work also be given.     Car Parks MD  10/22/19 0967

## 2019-10-22 NOTE — ED AVS SNAPSHOT
[facility], [place], Emergency Department  [address]  [facility] [place] [address]  Phone:  [phone]  Fax:  [phone]                                    Rodrigo Wallace   MRN: [id number]    Department:  [facility], Emergency Department   Date of Visit:  10/22/2019           After Visit Summary Signature Page    I have received my discharge instructions, and my questions have been answered. I have discussed any challenges I see with this plan with the nurse or doctor.    ..........................................................................................................................................  Patient/Patient Representative Signature      ..........................................................................................................................................  Patient Representative Print Name and Relationship to Patient    ..................................................               ................................................  Date                                   Time    ..........................................................................................................................................  Reviewed by Signature/Title    ...................................................              ..............................................  Date                                               Time          22EPIC Rev 08/18

## 2019-10-22 NOTE — DISCHARGE INSTRUCTIONS
Home.  Your shoulder is back in place now.  Wear the shoulder immobilizer until seen by orthopedic surgeon at [facility] for follow up.  Referral placed for [facility] to schedule appointment with shoulder specialist.  No work until then.  ICe and ibuprofen and tylenol as needed.  REturn if any concerns.    Please make an appointment to follow up with [facility] (phone: [phone]) as soon as possible.

## 2019-10-28 ENCOUNTER — TELEPHONE (OUTPATIENT)
Dept: ORTHOPEDICS | Facility: CLINIC | Age: 59
End: 2019-10-28

## 2019-10-28 NOTE — TELEPHONE ENCOUNTER
Willr called to follow up after pt's visit to ER that ended with referral.  offered an apt with Dr. Doshi at 1pm. There was no answer, message left on voice mail.     Dyan Raphael LPN

## 2019-10-28 NOTE — TELEPHONE ENCOUNTER
Writer called pt back to F/U and see if the 1pm apt on 10/30/19 would work to see Dr. Doshi. At this time pt declined apt stating they were following up with a different provider.     Dyan Raphael LPN

## 2020-12-06 ENCOUNTER — APPOINTMENT (OUTPATIENT)
Dept: GENERAL RADIOLOGY | Facility: CLINIC | Age: 60
End: 2020-12-06
Attending: EMERGENCY MEDICINE
Payer: COMMERCIAL

## 2020-12-06 ENCOUNTER — HOSPITAL ENCOUNTER (EMERGENCY)
Facility: CLINIC | Age: 60
Discharge: HOME | End: 2020-12-06
Attending: EMERGENCY MEDICINE | Admitting: EMERGENCY MEDICINE
Payer: COMMERCIAL

## 2020-12-06 VITALS
HEIGHT: 69 IN | HEART RATE: 71 BPM | SYSTOLIC BLOOD PRESSURE: 134 MMHG | DIASTOLIC BLOOD PRESSURE: 100 MMHG | RESPIRATION RATE: 18 BRPM | WEIGHT: 146 LBS | TEMPERATURE: 99.3 F | BODY MASS INDEX: 21.62 KG/M2 | OXYGEN SATURATION: 98 %

## 2020-12-06 DIAGNOSIS — U07.1 2019 NOVEL CORONAVIRUS DISEASE (COVID-19): ICD-10-CM

## 2020-12-06 DIAGNOSIS — Z87.891 PERSONAL HISTORY OF TOBACCO USE, PRESENTING HAZARDS TO HEALTH: ICD-10-CM

## 2020-12-06 DIAGNOSIS — R06.00 DYSPNEA, UNSPECIFIED TYPE: ICD-10-CM

## 2020-12-06 PROCEDURE — 99283 EMERGENCY DEPT VISIT LOW MDM: CPT

## 2020-12-06 PROCEDURE — 99283 EMERGENCY DEPT VISIT LOW MDM: CPT | Performed by: EMERGENCY MEDICINE

## 2020-12-06 PROCEDURE — 71045 X-RAY EXAM CHEST 1 VIEW: CPT

## 2020-12-06 ASSESSMENT — MIFFLIN-ST. JEOR: SCORE: 1462.63

## 2020-12-06 NOTE — DISCHARGE INSTRUCTIONS
Please make an appointment to follow up with Your Primary Care Provider in 2 weeks. Return to the ER with any worsening symptoms or any concerns.

## 2020-12-06 NOTE — ED PROVIDER NOTES
"ED Provider Note  [facility]      History     Chief Complaint   Patient presents with     Shortness of Breath     tested positive for COVID 11/25     HPI  Rodrigo Wallace is a 60 year old male who breath for the last few days.  He states that on 11/25 he got a Covid test because it was free.  He was asymptomatic at that time, states he did not have any known contacts.  He states that he just thought he should get one.  He states that he did not become symptomatic until Tuesday, 5 days ago.  He states that since Tuesday he has had a significant dry cough.  He really denies all other symptoms, including headache, stuffy nose, sore throat, abdominal pain, nausea, vomiting, diarrhea, rash, loss of sense of taste or smell.  He states that he started to feel short of breath on Wednesday.  He states, \"I did something really stupid.\"  He tells me that he has been soaking paper towels in rubbing alcohol and putting them inside of his mask and inhaling the fumes, hoping this would help with the virus.  He states because of his age and the shortness of breath he was having he thought it mercedes to come in to make sure he did not have pneumonia.  No associated chest pain.  No other acute complaints.    Past Medical History  Past Medical History:   Diagnosis Date     High cholesterol      Myocardial infarction (H) 2008    States he has 1 sent placed.     Past Surgical History:   Procedure Laterality Date     CARDIAC SURGERY  2008    Stent place through right groin          aspirin (ASPIRIN 81) 81 MG chewable tablet       atorvastatin (LIPITOR) 80 MG tablet       diphenhydrAMINE (BENADRYL) 25 MG capsule       metoprolol tartrate (LOPRESSOR) 25 MG tablet       ticagrelor (BRILINTA) 90 MG tablet      No Known Allergies  Family History  History reviewed. No pertinent family history.  Social History   Social History     Tobacco Use     Smoking status: Former Smoker     Smokeless tobacco: Never Used     " "Tobacco comment: Quit smoking 2003   Substance Use Topics     Alcohol use: No     Drug use: No      Past medical history, past surgical history, medications, allergies, family history, and social history were reviewed with the patient. No additional pertinent items.       Review of Systems  A complete review of systems was performed with pertinent positives and negatives noted in the HPI, and all other systems negative.    Physical Exam   BP: 136/72  Pulse: 86  Temp: 100.9  F (38.3  C)  Resp: 18  Height: 175.3 cm (5' 9\")  Weight: 66.2 kg (146 lb)  SpO2: 98 %  Physical Exam  Constitutional:       General: He is not in acute distress.     Appearance: He is not diaphoretic.   HENT:      Head: Atraumatic.   Eyes:      General: No scleral icterus.  Cardiovascular:      Heart sounds: Normal heart sounds.   Pulmonary:      Effort: No respiratory distress.      Breath sounds: Normal breath sounds.   Abdominal:      Palpations: Abdomen is soft.      Tenderness: There is no abdominal tenderness.   Musculoskeletal:         General: No tenderness.   Skin:     General: Skin is warm.      Findings: No rash.         ED Course      Procedures                         Results for orders placed or performed during the hospital encounter of 12/06/20   XR Chest Port 1 View     Status: None    Narrative    EXAM: XR CHEST PORT 1 VW  LOCATION: [facility]  DATE/TIME: 12/6/2020 4:11 AM    INDICATION: Shortness of breath. Known COVID.  COMPARISON: 03/23/2018      Impression    IMPRESSION: Heart size is normal. Patchy bilateral airspace infiltrates consistent with given history of COVID-19. No pneumothorax. Slight blunting of the right costophrenic angle could reflect a small pleural effusion. Arthritic changes of the shoulders   with postoperative change in the right humeral head.     Medications - No data to display     Assessments & Plan (with Medical Decision Making)   I did not discuss the patient's rubbing alcohol " inhalational exposure with poison center who did not feel there is any cause for concern.  The patient is counseled to discontinue this, and does state that he is already discontinued this and will not do this again.  O2 saturations here are normal, in the upper 90s on room air.  The patient is in no distress.  Chest x-ray was done and he does have some patchy bilateral infiltrates consistent with COVID-19.  Nothing to suggest localized bacterial pneumonia.  The patient is counseled to continue his quarantine at home.  He is specifically instructed to return to the ER if he feels that his shortness of breath is worsening or if he has any other concerns.  He should follow-up with his clinic in 2 weeks if he has any ongoing concerns.  The patient verbalizes understanding and is agreeable to the plan.    Dictation Disclaimer: Some of this Note has been completed with voice-recognition dictation software. Although errors are generally corrected real-time, there is the potential for a rare error to be present in the completed chart.      I have reviewed the nursing notes. I have reviewed the findings, diagnosis, plan and need for follow up with the patient.    New Prescriptions    No medications on file       Final diagnoses:   Dyspnea, unspecified type   2019 novel coronavirus disease (COVID-19)       --  Dyan Hester  [facility] EMERGENCY DEPARTMENT  12/6/2020     Dyan Hester MD  12/06/20 0441

## 2020-12-06 NOTE — ED AVS SNAPSHOT
[facility] Emergency Department  [address]  [place] [address]  Phone: [phone]  Fax: [phone]                                    Rodrigo Wallace   MRN: [id number]    Department: [facility] Emergency Department   Date of Visit: 12/6/2020           After Visit Summary Signature Page    I have received my discharge instructions, and my questions have been answered. I have discussed any challenges I see with this plan with the nurse or doctor.    ..........................................................................................................................................  Patient/Patient Representative Signature      ..........................................................................................................................................  Patient Representative Print Name and Relationship to Patient    ..................................................               ................................................  Date                                   Time    ..........................................................................................................................................  Reviewed by Signature/Title    ...................................................              ..............................................  Date                                               Time          22EPIC Rev 08/18

## 2020-12-06 NOTE — ED TRIAGE NOTES
Patient came to the ED from home with increasing shortness of breath. Patient tested positive for COVID 11/25. Has had fevers, cough and shortness of breath.

## 2021-03-21 ENCOUNTER — APPOINTMENT (OUTPATIENT)
Dept: GENERAL RADIOLOGY | Facility: CLINIC | Age: 61
End: 2021-03-21
Attending: EMERGENCY MEDICINE
Payer: COMMERCIAL

## 2021-03-21 ENCOUNTER — HOSPITAL ENCOUNTER (EMERGENCY)
Facility: CLINIC | Age: 61
Discharge: HOME | End: 2021-03-21
Attending: EMERGENCY MEDICINE | Admitting: EMERGENCY MEDICINE
Payer: COMMERCIAL

## 2021-03-21 VITALS
WEIGHT: 150 LBS | BODY MASS INDEX: 24.99 KG/M2 | RESPIRATION RATE: 18 BRPM | DIASTOLIC BLOOD PRESSURE: 84 MMHG | HEART RATE: 82 BPM | HEIGHT: 65 IN | SYSTOLIC BLOOD PRESSURE: 155 MMHG | TEMPERATURE: 97.9 F | OXYGEN SATURATION: 100 %

## 2021-03-21 DIAGNOSIS — M25.562 ACUTE PAIN OF LEFT KNEE: ICD-10-CM

## 2021-03-21 DIAGNOSIS — S82.002A CLOSED NONDISPLACED FRACTURE OF LEFT PATELLA, UNSPECIFIED FRACTURE MORPHOLOGY, INITIAL ENCOUNTER: ICD-10-CM

## 2021-03-21 PROCEDURE — 73562 X-RAY EXAM OF KNEE 3: CPT | Mod: LT

## 2021-03-21 PROCEDURE — 29505 APPLICATION LONG LEG SPLINT: CPT | Performed by: EMERGENCY MEDICINE

## 2021-03-21 PROCEDURE — 99284 EMERGENCY DEPT VISIT MOD MDM: CPT | Performed by: EMERGENCY MEDICINE

## 2021-03-21 PROCEDURE — 99284 EMERGENCY DEPT VISIT MOD MDM: CPT | Mod: 25 | Performed by: EMERGENCY MEDICINE

## 2021-03-21 ASSESSMENT — ENCOUNTER SYMPTOMS
BACK PAIN: 0
ABDOMINAL PAIN: 0
WEAKNESS: 0
RHINORRHEA: 0
CONFUSION: 0
DYSURIA: 0
FEVER: 0
BRUISES/BLEEDS EASILY: 1
SHORTNESS OF BREATH: 0

## 2021-03-21 NOTE — DISCHARGE INSTRUCTIONS
Thank you for your patience today.  Please follow-up with orthopedics in clinic this week. They should call you  Monday to schedule an appointment.  Please keep your leg/knee straight at all times.  Please keep your leg in the knee immobilizer at all times and do not bend your knee.  You may bear weight as tolerated and walk on your left lower extremity as long as you do not bend your knee.  Please take Tylenol and ibuprofen as needed for pain.   Please continue your own medications.  Please return to the ER if you develop any worsening of your current symptoms.  It was a pleasure taking care of you today.  We hope you feel better soon.    Please make an appointment to follow up with Orthopedics Clinic this week (phone: [phone])

## 2021-03-21 NOTE — PLAN OF CARE
Brief Orthopedic Note    Contacted by the ED regarding this 59 yo male who fell onto his left knee on Thursday. He has been ambulating with pain since then and presents to the ER on 03/21/21 for evaluation. XR was obtained which demonstrates fracture of the inferior pole of the patella. Recommend knee immobilizer and no ROM of left knee. WBAT in KI. Encourage ice and elevation to reduce swelling. Patient should follow up in orthopedic clinic this week (message sent to schedulers).     Harris Rice MD  Orthopaedic Surgery, PGY4  [phone]

## 2021-03-21 NOTE — ED PROVIDER NOTES
ED Provider Note  [facility]      History     Chief Complaint   Patient presents with     Knee Pain     HPI  Rodrigo Wallace is a 60 year old male who presents the emergency department with a complaint of left knee pain.  Patient reports that on Thursday 3/18 he was walking when he excellently tripped and fell.  Patient reports that he hit his left knee and right elbow.  Patient states his right elbow is fine however he still having pain in his left knee.  Patient describes the pain as a constant pain that seems to be worse when he goes up and down the stairs.  Patient is able to ambulate but has pain with ambulating. No radiation of the pain. No relieving factors. Patient denies any other injuries, denies any weakness, tingling, numbness.  Patient took ibuprofen for pain with no improvement of his symptoms.  Patient does take aspirin daily.    Past Medical History  Past Medical History:   Diagnosis Date     High cholesterol      Hypertension      Myocardial infarction (H) 2008    States he has 1 sent placed.     Past Surgical History:   Procedure Laterality Date     CARDIAC SURGERY  2008    Stent place through right groin     aspirin (ASPIRIN 81) 81 MG chewable tablet  atorvastatin (LIPITOR) 80 MG tablet  diphenhydrAMINE (BENADRYL) 25 MG capsule  metoprolol tartrate (LOPRESSOR) 25 MG tablet  ticagrelor (BRILINTA) 90 MG tablet      No Known Allergies  Family History  History reviewed. No pertinent family history.  Social History   Social History     Tobacco Use     Smoking status: Former Smoker     Smokeless tobacco: Never Used     Tobacco comment: Quit smoking 2003   Substance Use Topics     Alcohol use: No     Drug use: No      Past medical history, past surgical history, medications, allergies, family history, and social history were reviewed with the patient. No additional pertinent items.       Review of Systems   Constitutional: Negative for fever.   HENT: Negative for rhinorrhea.  "   Eyes: Negative for visual disturbance.   Respiratory: Negative for shortness of breath.    Cardiovascular: Negative for chest pain.   Gastrointestinal: Negative for abdominal pain.   Endocrine: Negative for polyuria.   Genitourinary: Negative for dysuria.   Musculoskeletal: Negative for back pain.        Left knee pain   Skin: Negative for rash.   Allergic/Immunologic: Negative for immunocompromised state.   Neurological: Negative for weakness.   Hematological: Bruises/bleeds easily.   Psychiatric/Behavioral: Negative for confusion.     Physical Exam   BP: (!) 146/93  Pulse: 71  Temp: 97.9  F (36.6  C)  Resp: 16  Height: 165.1 cm (5' 5\")  Weight: 68 kg (150 lb)  SpO2: 99 %  Physical Exam  General: Afebrile, no acute distress   HEENT: Normocephalic, atraumatic, conjunctivae normal. MMM  Neck: non-tender, supple  Cardio: regular rate. regular rhythm   Resp: Normal work of breathing, no respiratory distress, lungs clear bilaterally, no wheezing, rhonchi, rales  Chest/Back: no visual signs of trauma, no CVA tenderness   Abdomen: soft, non distension, no tenderness, no peritoneal signs   Neuro: alert and fully oriented. CN II-XII grossly intact. Grossly normal strength and sensation in all extremities.   MSK: Left knee with ecchymosis and small hematoma over anterior aspect, + diffuse TTP, full ROM, no obvious deformities.   Integumentary/Skin: no rash visualized, normal color  Psych: normal affect, normal behavior    ED Course      Procedures     Results for orders placed or performed during the hospital encounter of 03/21/21   XR Knee Left 3 Views     Status: None    Narrative    EXAM: XR KNEE LEFT 3 VIEW  LOCATION: [facility]  DATE/TIME: 03/21/2021, 5:27 AM    INDICATION: Pain after trauma.  COMPARISON: None.      Impression    IMPRESSION: Slightly distracted fracture at the inferior pole of the patella.       Medications - No data to display     Assessments & Plan (with Medical Decision Making) "   Rodrigo Wallace is a 60 year old male who presents the emergency department with a complaint of left knee pain s/p a fall 3 days ago.  Upon arrival patient is well-appearing, afebrile, no distress.  Patient here with left knee pain, on exam patient with tenderness to palpation, left knee ecchymosis, small hematoma.  Full range of motion.  Patient is able to ambulate without difficulty.  Differential diagnosis includes but is not limited to contusion versus hematoma versus fracture among others.  At this time patient does not want anything for pain.  Will obtain x-ray, and reevaluate.    I reviewed x-ray which demonstrates fracture at the inferior pole of the patella.  I discussed results with patient.  Patient placed in knee immobilizer.  I discussed with orthopedics and will plan for follow-up in orthopedics clinic this week.  Patient instructed to keep his place in knee immobilizer with his knee straight, no bending the knee, weightbearing as tolerated as long as his knee is straight.  Recommend continue supportive care with rest, Tylenol or ibuprofen as needed for pain. Return precautions discussed if severe pain, tingling, numbness, any worsening symptoms.  Patient understands agrees the plan.    I have reviewed the nursing notes. I have reviewed the findings, diagnosis, plan and need for follow up with the patient.    New Prescriptions    No medications on file       Final diagnoses:   Acute pain of left knee   Closed nondisplaced fracture of left patella, unspecified fracture morphology, initial encounter       --  Vero Hoskins MD  [facility] EMERGENCY DEPARTMENT  3/21/2021     Vero Hoskins MD  03/21/21 0616

## 2021-03-21 NOTE — ED TRIAGE NOTES
Patient presents to the ED after having a fall 3/18, patient fell on left knee and pain has not gotten better currently 7/10.

## 2021-03-22 NOTE — TELEPHONE ENCOUNTER
DIAGNOSIS: ER f/up-left knee fracture/ ref by ER   APPOINTMENT DATE: 3.23.21   NOTES STATUS DETAILS   OFFICE NOTE from referring provider N/A    OFFICE NOTE from other specialist N/A    DISCHARGE SUMMARY from hospital N/A    DISCHARGE REPORT from the ER Internal 3.21.21 [facility]   OPERATIVE REPORT N/A    EMG report N/A    MEDICATION LIST N/A    MRI N/A    DEXA (osteoporosis/bone health) N/A    CT SCAN N/A    XRAYS (IMAGES & REPORTS) Internal 3.21.21 L knee

## 2021-03-23 ENCOUNTER — PRE VISIT (OUTPATIENT)
Dept: ORTHOPEDICS | Facility: CLINIC | Age: 61
End: 2021-03-23

## 2021-03-23 ENCOUNTER — OFFICE VISIT (OUTPATIENT)
Dept: ORTHOPEDICS | Facility: CLINIC | Age: 61
End: 2021-03-23
Payer: COMMERCIAL

## 2021-03-23 VITALS — HEIGHT: 65 IN | BODY MASS INDEX: 24.99 KG/M2 | WEIGHT: 150 LBS

## 2021-03-23 DIAGNOSIS — S82.035A CLOSED NONDISPLACED TRANSVERSE FRACTURE OF LEFT PATELLA, INITIAL ENCOUNTER: Primary | ICD-10-CM

## 2021-03-23 PROCEDURE — 99203 OFFICE O/P NEW LOW 30 MIN: CPT | Performed by: FAMILY MEDICINE

## 2021-03-23 ASSESSMENT — MIFFLIN-ST. JEOR: SCORE: 1417.28

## 2021-03-23 NOTE — PROGRESS NOTES
SPORTS & ORTHOPEDIC WALK-IN VISIT 3/23/2021    Primary Care Physician: [facility]    Reason for visit:     What part of your body is injured / painful?  left anterior knee    What caused the injury /pain? Fall- landed onto left knee and right elbow    How long ago did your injury occur or pain begin? 3/18/2021- 5 day(s)    What are your most bothersome symptoms? Pain and Swelling    What makes your symptoms better? Rest, immobilizer, ice, and knee sleeve     What makes your symptoms worse? Walking and stairs    Have you been previously seen for this problem? Yes, ED and xrays    Medical History:    Any recent changes to your medical history? No    Any new medication prescribed since last visit? No    Have you had surgery on this body part before? No    Social History:    Occupation: [employer]- works in [employer]    Handedness: Right    Exercise: Calisthenics, lift weights

## 2021-03-23 NOTE — PROGRESS NOTES
"CHIEF COMPLAINT:  Consult (Left patella fracture)       HISTORY OF PRESENT ILLNESS  Mr. Wallace is a pleasant 60 year old male who presents to clinic today with a left knee injury.  Rodrigo injured his knee 5 days ago.  He tripped and fell while walking, striking his left knee and his right elbow.  He was seen at the emergency department 2 days ago where he was diagnosed with a fracture of the patella.  He was put into a knee immobilizer and referred for treatment.  His pain is somewhat improved after being in the knee immobilizer, although still quite painful.        Additional history: as documented    MEDICAL HISTORY  Patient Active Problem List   Diagnosis     Chest pain       Current Outpatient Medications   Medication Sig Dispense Refill     aspirin (ASPIRIN 81) 81 MG chewable tablet Take 162-243 mg by mouth daily       atorvastatin (LIPITOR) 80 MG tablet Take 1 tablet (80 mg) by mouth daily 90 tablet 3     diphenhydrAMINE (BENADRYL) 25 MG capsule Take 1-2 capsules (25-50 mg) by mouth every 6 hours as needed for itching or allergies 60 capsule 0     metoprolol tartrate (LOPRESSOR) 25 MG tablet Take 0.5 tablets (12.5 mg) by mouth 2 times daily 90 tablet 3     ticagrelor (BRILINTA) 90 MG tablet Take 1 tablet (90 mg) by mouth 2 times daily 120 tablet 3       No Known Allergies    No family history on file.    Additional medical/Social/Surgical histories reviewed in [facility] and updated as appropriate.        PHYSICAL EXAM  Ht 1.651 m (5' 5\")   Wt 68 kg (150 lb)   BMI 24.96 kg/m    General  - normal appearance, in no obvious distress  HEENT  - conjunctivae not injected, moist mucous membranes  CV  - normal popliteal pulse  Pulm  - normal respiratory pattern, non-labored  Musculoskeletal - left knee  - inspection: generalized swelling, no effusion  - palpation: tender inferior patella  - strength: 5/5 in flexion, 5/5 in extension  Neuro  - no sensory or motor deficit, grossly normal coordination, normal muscle " tone  Skin  - no ecchymosis, erythema, warmth, or induration, no obvious rash  Psych  - interactive, appropriate, normal mood and affect               ASSESSMENT & PLAN  Mr. Wallace is a 60 year old male who presents to clinic today with a left knee injury.    I reviewed his x-ray in the room with him which does reveal a patellar fracture.  This is a inferior pole fracture that has approximately 1 mm of displacement.  Given his minimal displacement and intact extensor mechanism I do think it would be reasonable to treat nonoperatively with a brace.    I am ordering a TROM brace to be set between 0-20 degrees.  He is going to follow-up with me in 3 weeks.    It was a pleasure seeing Rodrigo today.    Harris Verdugo DO, [facility]  Primary Care Sports Medicine      This note was constructed using Dragon dictation software, please excuse any minor errors in spelling, grammar, or syntax.

## 2021-03-23 NOTE — LETTER
"  3/23/2021      RE: Rodrigo Wallace  [address]  [place] [address]       CHIEF COMPLAINT:  Consult (Left patella fracture)       HISTORY OF PRESENT ILLNESS  Mr. Wallace is a pleasant 60 year old male who presents to clinic today with a left knee injury.  Rodrigo injured his knee 5 days ago.  He tripped and fell while walking, striking his left knee and his right elbow.  He was seen at the emergency department 2 days ago where he was diagnosed with a fracture of the patella.  He was put into a knee immobilizer and referred for treatment.  His pain is somewhat improved after being in the knee immobilizer, although still quite painful.        Additional history: as documented    MEDICAL HISTORY  Patient Active Problem List   Diagnosis     Chest pain       Current Outpatient Medications   Medication Sig Dispense Refill     aspirin (ASPIRIN 81) 81 MG chewable tablet Take 162-243 mg by mouth daily       atorvastatin (LIPITOR) 80 MG tablet Take 1 tablet (80 mg) by mouth daily 90 tablet 3     diphenhydrAMINE (BENADRYL) 25 MG capsule Take 1-2 capsules (25-50 mg) by mouth every 6 hours as needed for itching or allergies 60 capsule 0     metoprolol tartrate (LOPRESSOR) 25 MG tablet Take 0.5 tablets (12.5 mg) by mouth 2 times daily 90 tablet 3     ticagrelor (BRILINTA) 90 MG tablet Take 1 tablet (90 mg) by mouth 2 times daily 120 tablet 3       No Known Allergies    No family history on file.    Additional medical/Social/Surgical histories reviewed in [facility] and updated as appropriate.        PHYSICAL EXAM  Ht 1.651 m (5' 5\")   Wt 68 kg (150 lb)   BMI 24.96 kg/m    General  - normal appearance, in no obvious distress  HEENT  - conjunctivae not injected, moist mucous membranes  CV  - normal popliteal pulse  Pulm  - normal respiratory pattern, non-labored  Musculoskeletal - left knee  - inspection: generalized swelling, no effusion  - palpation: tender inferior patella  - strength: 5/5 in flexion, 5/5 in " extension  Neuro  - no sensory or motor deficit, grossly normal coordination, normal muscle tone  Skin  - no ecchymosis, erythema, warmth, or induration, no obvious rash  Psych  - interactive, appropriate, normal mood and affect               ASSESSMENT & PLAN  Mr. Wallace is a 60 year old male who presents to clinic today with a left knee injury.    I reviewed his x-ray in the room with him which does reveal a patellar fracture.  This is a inferior pole fracture that has approximately 1 mm of displacement.  Given his minimal displacement and intact extensor mechanism I do think it would be reasonable to treat nonoperatively with a brace.    I am ordering a TROM brace to be set between 0-20 degrees.  He is going to follow-up with me in 3 weeks.    It was a pleasure seeing Rodrigo today.    Harris Verdugo DO, [facility]  Primary Care Sports Medicine      This note was constructed using Dragon dictation software, please excuse any minor errors in spelling, grammar, or syntax.            SPORTS & ORTHOPEDIC WALK-IN VISIT 3/23/2021    Primary Care Physician: [facility]    Reason for visit:     What part of your body is injured / painful?  left anterior knee    What caused the injury /pain? Fall- landed onto left knee and right elbow    How long ago did your injury occur or pain begin? 3/18/2021- 5 day(s)    What are your most bothersome symptoms? Pain and Swelling    What makes your symptoms better? Rest, immobilizer, ice, and knee sleeve     What makes your symptoms worse? Walking and stairs    Have you been previously seen for this problem? Yes, ED and xrays    Medical History:    Any recent changes to your medical history? No    Any new medication prescribed since last visit? No    Have you had surgery on this body part before? No    Social History:    Occupation: [employer]- works in [employer]    Handedness: Right    Exercise: Calisthenics, lift weights                Harris Verdugo DO

## 2021-03-23 NOTE — LETTER
March 23, 2021      Rodrigo Wallace  [address]  [place] [address]              Dear  or ,    Mr. Wallace is under the care of our orthopedic office for a fracture of the left knee.  It is our official recommendation that he be allowed to refrain from strenuous activity, including his work at the [employer].  These restrictions should be in place for the following 3 weeks, after which time he will follow-up to be reassessed.  If restrictions need to be extended at that time we could consider this, or we could consider a return to duty.    Please call with questions or concerns regarding these recommendations.      Sincerely,              Harris Verdugo DO [facility]

## 2021-03-28 ENCOUNTER — DOCUMENTATION ONLY (OUTPATIENT)
Dept: CARE COORDINATION | Facility: CLINIC | Age: 61
End: 2021-03-28

## 2021-03-29 ENCOUNTER — TELEPHONE (OUTPATIENT)
Dept: ORTHOPEDICS | Facility: CLINIC | Age: 61
End: 2021-03-29

## 2021-03-29 NOTE — TELEPHONE ENCOUNTER
[facility] Call Center    Phone Message    May a detailed message be left on voicemail: yes     Reason for Call: Other: clarify / call back     Action Taken: Message routed to:  [facility] ([facility]): yamilet    Travel Screening: Not Applicable     Patient says he was given a list of places to get his knee brace -- either they were booked too far out // or they did not take his insurance -- wants to talk with team for other places he can call / options

## 2021-03-31 NOTE — TELEPHONE ENCOUNTER
Spoke with patient this afternoon regarding knee brace issue. ATC spoke with Dr. Verdugo and he recommended that the patient stay in the brace he had during his appointment on 3/23/21. ATC provided patient with orthotics number so he can call and schedule an appointment to get brace that Dr. Verdugo ordered. He was satisfied with this and had no further questions. He will see Dr. Verdugo again on 4/13/21.    ALISA Cabral

## 2021-04-05 ENCOUNTER — DOCUMENTATION ONLY (OUTPATIENT)
Dept: ORTHOPEDICS | Facility: CLINIC | Age: 61
End: 2021-04-05

## 2021-04-05 NOTE — PROGRESS NOTES
S: Rodrigo Wallace was seen today in [facility] with a prescription from Dr. Harris Verdugo,  for a ROM Functional Brace.  The patient states he is interested in learning what type of brace was prescribed for him and what kind of expenses he would incur.  The Dx:  The patient has a left nondisplaced transverse patella fracture [S82.035A].      O: Patient is a 61 y/o male, 5  5 and weighs about 153 pounds.  The patient limped into the office doing his best not to move his left knee.  Upon examination, the patient is wearing a left short hinged knee brace that offers no ROM limitation that the patient states that he received from his workplace.    I presented the patient with a T-Scope ROM knee brace and explained that his physician has prescribed a brace that limits the patient s range of motion to from 0 to 20 degrees.  The patient showed me images of a knee immobilizer brace that he has at home.  The patient states that it does a good job of keeping his knee at 0 degrees.  I used a goniometer to demonstrate 0 to 20 degrees of motion.  The patient states that his is recovering at home and that he only wore the hinges knee brace to wear to get to this appointment because it was easier to get in the car.  The patient was also concerted about out of pocket expenses.  The  went over the potential out of pocket/health care savings account dollars that a new brace would cost.    A:  The patient decided that since there was relatively little range of motion to be gained from the new the ROM brace relative to his immobilizer, that he will just wear the immobilizer for now.  I asked him to continue to follow up with his physician regarding his healing and to return this clinic if her changes his mind.    The patient was alert/oriented during the appointment and not in distress. The patient has no known allergies to materials.      P: Patient will continue to us his knee immobilizer for the time being.  He  will return to the [facility] should he change his mind or need a new KO in the future.    G: The prescribed goal is to maintain joint alignment, provide protective joint stability with a range of motion of 0 to 20 degrees.  The patient s immobilizer offers less ROM than the full 0 to 20-degree range but does fall safely within the upper limit of the prescription.  Depending upon strap tightness, the immobilizer may offer more than 0 degrees but likely less than 20 degrees ROM.  Electronically signed by Alvino Naranjo CPO, BETINAO

## 2021-04-13 ENCOUNTER — OFFICE VISIT (OUTPATIENT)
Dept: ORTHOPEDICS | Facility: CLINIC | Age: 61
End: 2021-04-13
Payer: COMMERCIAL

## 2021-04-13 ENCOUNTER — ANCILLARY PROCEDURE (OUTPATIENT)
Dept: GENERAL RADIOLOGY | Facility: CLINIC | Age: 61
End: 2021-04-13
Attending: FAMILY MEDICINE
Payer: COMMERCIAL

## 2021-04-13 VITALS — BODY MASS INDEX: 24.99 KG/M2 | WEIGHT: 150 LBS | HEIGHT: 65 IN

## 2021-04-13 DIAGNOSIS — S82.035A CLOSED NONDISPLACED TRANSVERSE FRACTURE OF LEFT PATELLA, INITIAL ENCOUNTER: ICD-10-CM

## 2021-04-13 DIAGNOSIS — S82.035D CLOSED NONDISPLACED TRANSVERSE FRACTURE OF LEFT PATELLA WITH ROUTINE HEALING, SUBSEQUENT ENCOUNTER: Primary | ICD-10-CM

## 2021-04-13 PROCEDURE — 73562 X-RAY EXAM OF KNEE 3: CPT | Mod: LT | Performed by: RADIOLOGY

## 2021-04-13 PROCEDURE — 99213 OFFICE O/P EST LOW 20 MIN: CPT | Performed by: FAMILY MEDICINE

## 2021-04-13 ASSESSMENT — MIFFLIN-ST. JEOR: SCORE: 1417.28

## 2021-04-13 NOTE — PROGRESS NOTES
"ESTABLISHED PATIENT FOLLOW-UP:  Consult (Left patella fracture)       HISTORY OF PRESENT ILLNESS  Mr. Wallace is a pleasant 60 year old year old male who presents to clinic today for follow-up of left patellar fracture.  He is doing quite well today, he feels as if his pain is only present on occasion.  He has been using his brace at all times.  He still feels pain in the anterior portion of his knee.    Date of injury/onset: 3/18/21  Date last seen: 3/23/21  Following Therapeutic Plan: Yes  Pain: Improving  Function: Unchanged  Interval History: Overall he has been doing well. He has pain while walking still.      Review of Systems:    Do you have fever, chills, weight loss? No    Do you have any vision problems? No    Do you have any chest pain or edema? No    Do you have any shortness of breath or wheezing?  No    Do you have stomach problems? No    Do you have any numbness or focal weakness? No    Do you have diabetes? No    Do you have problems with bleeding or clotting? No    Do you have an rashes or other skin lesions? No    MEDICAL HISTORY  Patient Active Problem List   Diagnosis     Chest pain       Current Outpatient Medications   Medication Sig Dispense Refill     aspirin (ASPIRIN 81) 81 MG chewable tablet Take 162-243 mg by mouth daily       atorvastatin (LIPITOR) 80 MG tablet Take 1 tablet (80 mg) by mouth daily 90 tablet 3     diphenhydrAMINE (BENADRYL) 25 MG capsule Take 1-2 capsules (25-50 mg) by mouth every 6 hours as needed for itching or allergies 60 capsule 0     metoprolol tartrate (LOPRESSOR) 25 MG tablet Take 0.5 tablets (12.5 mg) by mouth 2 times daily 90 tablet 3     ticagrelor (BRILINTA) 90 MG tablet Take 1 tablet (90 mg) by mouth 2 times daily 120 tablet 3       No Known Allergies    No family history on file.    Additional medical/Social/Surgical histories reviewed in [facility] and updated as appropriate.       PHYSICAL EXAM  Ht 1.651 m (5' 5\")   Wt 68 kg (150 lb)   BMI 24.96 kg/m  "     General  - normal appearance, in no obvious distress  HEENT  - conjunctivae not injected, moist mucous membranes  CV  - normal popliteal pulse  Pulm  - normal respiratory pattern, non-labored  Musculoskeletal - left knee  - stance: normal gait without limp, normal single leg squat, no obvious leg length discrepancy  - inspection: no swelling or effusion, normal bone and joint alignment, no obvious deformity  - palpation: Tender anterior inferior patella    Neuro  - no sensory or motor deficit, grossly normal coordination, normal muscle tone  Skin  - no ecchymosis, erythema, warmth, or induration, no obvious rash  Psych  - interactive, appropriate, normal mood and affect          IMAGING : Left knee. Final results and radiologist's interpretation, available in the [facility] health record. Images were reviewed with the patient/family members in the office today. My personal interpretation of the performed imaging -increased fracture line distance, no displacement       ASSESSMENT & PLAN  Mr. Wallace is a 60 year old year old male following up with a fracture of his left patella.    I am happy that Rodrigo is improving.    We should follow-up in 4 weeks with a repeat x-ray, we did discuss that he should continue to wear his brace and avoid exacerbating activities that are painful.  I did write him a note to stay off of work for the next 4 weeks, at our follow-up visit we may clear him for work and start physical therapy, if appropriate.    It was a pleasure seeing Rodrigo.    Mode Verdugo, DO CAQSM

## 2021-04-13 NOTE — LETTER
4/13/2021      RE: Rodrigo Wallace  [address]  [facility] [address]       ESTABLISHED PATIENT FOLLOW-UP:  Consult (Left patella fracture)       HISTORY OF PRESENT ILLNESS  Mr. Wallace is a pleasant 60 year old year old male who presents to clinic today for follow-up of left patellar fracture.  He is doing quite well today, he feels as if his pain is only present on occasion.  He has been using his brace at all times.  He still feels pain in the anterior portion of his knee.    Date of injury/onset: 3/18/21  Date last seen: 3/23/21  Following Therapeutic Plan: Yes  Pain: Improving  Function: Unchanged  Interval History: Overall he has been doing well. He has pain while walking still.      Review of Systems:    Do you have fever, chills, weight loss? No    Do you have any vision problems? No    Do you have any chest pain or edema? No    Do you have any shortness of breath or wheezing?  No    Do you have stomach problems? No    Do you have any numbness or focal weakness? No    Do you have diabetes? No    Do you have problems with bleeding or clotting? No    Do you have an rashes or other skin lesions? No    MEDICAL HISTORY  Patient Active Problem List   Diagnosis     Chest pain       Current Outpatient Medications   Medication Sig Dispense Refill     aspirin (ASPIRIN 81) 81 MG chewable tablet Take 162-243 mg by mouth daily       atorvastatin (LIPITOR) 80 MG tablet Take 1 tablet (80 mg) by mouth daily 90 tablet 3     diphenhydrAMINE (BENADRYL) 25 MG capsule Take 1-2 capsules (25-50 mg) by mouth every 6 hours as needed for itching or allergies 60 capsule 0     metoprolol tartrate (LOPRESSOR) 25 MG tablet Take 0.5 tablets (12.5 mg) by mouth 2 times daily 90 tablet 3     ticagrelor (BRILINTA) 90 MG tablet Take 1 tablet (90 mg) by mouth 2 times daily 120 tablet 3       No Known Allergies    No family history on file.    Additional medical/Social/Surgical histories reviewed in [facility] and updated as  "appropriate.       PHYSICAL EXAM  Ht 1.651 m (5' 5\")   Wt 68 kg (150 lb)   BMI 24.96 kg/m      General  - normal appearance, in no obvious distress  HEENT  - conjunctivae not injected, moist mucous membranes  CV  - normal popliteal pulse  Pulm  - normal respiratory pattern, non-labored  Musculoskeletal - left knee  - stance: normal gait without limp, normal single leg squat, no obvious leg length discrepancy  - inspection: no swelling or effusion, normal bone and joint alignment, no obvious deformity  - palpation: Tender anterior inferior patella    Neuro  - no sensory or motor deficit, grossly normal coordination, normal muscle tone  Skin  - no ecchymosis, erythema, warmth, or induration, no obvious rash  Psych  - interactive, appropriate, normal mood and affect          IMAGING : Left knee. Final results and radiologist's interpretation, available in the [facility] health record. Images were reviewed with the patient/family members in the office today. My personal interpretation of the performed imaging -increased fracture line distance, no displacement       ASSESSMENT & PLAN  Mr. Wallace is a 60 year old year old male following up with a fracture of his left patella.    I am happy that Rodrigo is improving.    We should follow-up in 4 weeks with a repeat x-ray, we did discuss that he should continue to wear his brace and avoid exacerbating activities that are painful.  I did write him a note to stay off of work for the next 4 weeks, at our follow-up visit we may clear him for work and start physical therapy, if appropriate.    It was a pleasure seeing Rodrigo.    Mode Verdugo, DO CAQSM        "

## 2021-04-16 ENCOUNTER — DOCUMENTATION ONLY (OUTPATIENT)
Dept: ORTHOPEDICS | Facility: CLINIC | Age: 61
End: 2021-04-16

## 2021-04-16 NOTE — PROGRESS NOTES
Printed off notes and letter requested by his work comp and faxed.  Placed in sent fax folder.     - Amilcar Jarrett ATC

## 2021-05-10 DIAGNOSIS — S82.035D CLOSED NONDISPLACED TRANSVERSE FRACTURE OF LEFT PATELLA WITH ROUTINE HEALING, SUBSEQUENT ENCOUNTER: Primary | ICD-10-CM

## 2021-05-11 ENCOUNTER — OFFICE VISIT (OUTPATIENT)
Dept: ORTHOPEDICS | Facility: CLINIC | Age: 61
End: 2021-05-11
Payer: COMMERCIAL

## 2021-05-11 ENCOUNTER — ANCILLARY PROCEDURE (OUTPATIENT)
Dept: GENERAL RADIOLOGY | Facility: CLINIC | Age: 61
End: 2021-05-11
Attending: INTERNAL MEDICINE
Payer: COMMERCIAL

## 2021-05-11 VITALS — BODY MASS INDEX: 24.96 KG/M2 | WEIGHT: 150 LBS

## 2021-05-11 DIAGNOSIS — S82.035D CLOSED NONDISPLACED TRANSVERSE FRACTURE OF LEFT PATELLA WITH ROUTINE HEALING, SUBSEQUENT ENCOUNTER: Primary | ICD-10-CM

## 2021-05-11 PROCEDURE — 73562 X-RAY EXAM OF KNEE 3: CPT | Mod: LT | Performed by: RADIOLOGY

## 2021-05-11 PROCEDURE — 99213 OFFICE O/P EST LOW 20 MIN: CPT | Performed by: FAMILY MEDICINE

## 2021-05-11 NOTE — PROGRESS NOTES
ESTABLISHED PATIENT FOLLOW-UP:  Follow Up of the Left Knee       HISTORY OF PRESENT ILLNESS  Mr. Wallace is a pleasant 60 year old year old male who presents to clinic today for follow-up of left patella fracture.    Date of injury: 3/18/21  Date last seen: 4/13/21  Following Therapeutic Plan: Yes  Pain: Improving  Function: Improving  Interval History: Doing well over all, pain with excessive flexion.  No new complaints or concerns, he has improved quite a bit since his last checkup.    Additional medical/Social/Surgical histories reviewed in [facility] and updated as appropriate.    REVIEW OF SYSTEMS (5/11/2021)  CONSTITUTIONAL: Denies fever and weight loss  GASTROINTESTINAL: Denies abdominal pain, nausea, vomiting  MUSCULOSKELETAL: See HPI  SKIN: Denies any recent rash or lesion  NEUROLOGICAL: Denies numbness or focal weakness     PHYSICAL EXAM    General  - normal appearance, in no obvious distress  HEENT  - conjunctivae not injected, moist mucous membranes  CV  - normal popliteal pulse  Pulm  - normal respiratory pattern, non-labored  Musculoskeletal -  Left knee  - stance: normal gait without limp  - inspection: no swelling or effusion  - palpation: mildly tender distal patella  - ROM: 120 degrees flexion,  degrees extension, not painful  - strength: 5/5 in flexion, 5/5 in extension  - special tests:  (-) Lachman  (-) Arielle  (-) varus at 0 and 30 degrees flexion  (-) valgus at 0 and 30 degrees flexion    Neuro  - no sensory or motor deficit, grossly normal coordination, normal muscle tone  Skin  - no ecchymosis, erythema, warmth, or induration, no obvious rash  Psych  - interactive, appropriate, normal mood and affect       ASSESSMENT & PLAN  Mr. Wallace is a 60 year old year old male following up with a transverse fracture of the distal pole of his patella, this is nondisplaced.    I ordered and reviewed an x-ray of his knee which redemonstrates his fracture with some evidence of bony healing.    Given that his  range of motion is full and that his pain is minimal I am referring him to physical therapy.  He can follow-up with me as needed for this and other issues.    It was a pleasure seeing Rodrigo.    Harris Verdugo DO, [facility]  Primary Care Sports Medicine

## 2021-05-11 NOTE — LETTER
5/11/2021      RE: Rodrigo Wallace  [address]  [facility] [address]       ESTABLISHED PATIENT FOLLOW-UP:  Follow Up of the Left Knee       HISTORY OF PRESENT ILLNESS  Mr. Wallace is a pleasant 60 year old year old male who presents to clinic today for follow-up of left patella fracture.    Date of injury: 3/18/21  Date last seen: 4/13/21  Following Therapeutic Plan: Yes  Pain: Improving  Function: Improving  Interval History: Doing well over all, pain with excessive flexion.  No new complaints or concerns, he has improved quite a bit since his last checkup.    Additional medical/Social/Surgical histories reviewed in [facility] and updated as appropriate.    REVIEW OF SYSTEMS (5/11/2021)  CONSTITUTIONAL: Denies fever and weight loss  GASTROINTESTINAL: Denies abdominal pain, nausea, vomiting  MUSCULOSKELETAL: See HPI  SKIN: Denies any recent rash or lesion  NEUROLOGICAL: Denies numbness or focal weakness     PHYSICAL EXAM    General  - normal appearance, in no obvious distress  HEENT  - conjunctivae not injected, moist mucous membranes  CV  - normal popliteal pulse  Pulm  - normal respiratory pattern, non-labored  Musculoskeletal -  Left knee  - stance: normal gait without limp  - inspection: no swelling or effusion  - palpation: mildly tender distal patella  - ROM: 120 degrees flexion,  degrees extension, not painful  - strength: 5/5 in flexion, 5/5 in extension  - special tests:  (-) Lachman  (-) Arielle  (-) varus at 0 and 30 degrees flexion  (-) valgus at 0 and 30 degrees flexion    Neuro  - no sensory or motor deficit, grossly normal coordination, normal muscle tone  Skin  - no ecchymosis, erythema, warmth, or induration, no obvious rash  Psych  - interactive, appropriate, normal mood and affect       ASSESSMENT & PLAN  Mr. Wallace is a 60 year old year old male following up with a transverse fracture of the distal pole of his patella, this is nondisplaced.    I ordered and reviewed an x-ray of his  knee which redemonstrates his fracture with some evidence of bony healing.    Given that his range of motion is full and that his pain is minimal I am referring him to physical therapy.  He can follow-up with me as needed for this and other issues.    It was a pleasure seeing Rodrigo.    Harris Verdugo DO, [facility]  Primary Care Sports Medicine

## 2021-05-11 NOTE — LETTER
[facility] [address]  [address] [facility] [address] [phone]        May 11, 2021    Regarding:  Rodrigo Wallace  [address]  [facility] [address]              To Whom It May Concetn;    Rodrigo Wallace is under my professional care following a left knee injury. He should be excused from work for the next 2 weeks. He may return to work on 5/25/21 with no restrictions at that time.  Please contact our office with any questions or concerns.           Sincerely,        Harris Verdugo, DO

## 2021-05-12 ENCOUNTER — TELEPHONE (OUTPATIENT)
Dept: ORTHOPEDICS | Facility: CLINIC | Age: 61
End: 2021-05-12

## 2021-05-12 NOTE — TELEPHONE ENCOUNTER
We receive fax from [email or web address] requesting office notes and workability status for Rodrigo.    I LVM with sports scheduling number asking what Pt's expectations are for return to work.    Dr. Verdugo said it seems like we can return to work with no restrictions, but I asked Pt to call us back to inform us on what he needs.    I let him know in the VM that we will call Tomorrow as well to see if we can catch him and update his work restrictions.    Paperwork is in Dr. Verdugo's bin near sports [employer].     - Amilcar Jarrett, ATC

## 2021-05-24 ENCOUNTER — THERAPY VISIT (OUTPATIENT)
Dept: PHYSICAL THERAPY | Facility: CLINIC | Age: 61
End: 2021-05-24
Payer: COMMERCIAL

## 2021-05-24 DIAGNOSIS — R29.898 WEAKNESS OF LEFT LOWER EXTREMITY: ICD-10-CM

## 2021-05-24 DIAGNOSIS — M25.562 LEFT KNEE PAIN: ICD-10-CM

## 2021-05-24 DIAGNOSIS — S82.035D CLOSED NONDISPLACED TRANSVERSE FRACTURE OF LEFT PATELLA WITH ROUTINE HEALING, SUBSEQUENT ENCOUNTER: ICD-10-CM

## 2021-05-24 PROCEDURE — 97161 PT EVAL LOW COMPLEX 20 MIN: CPT | Performed by: PHYSICAL THERAPIST

## 2021-05-24 PROCEDURE — 97110 THERAPEUTIC EXERCISES: CPT | Performed by: PHYSICAL THERAPIST

## 2021-05-24 ASSESSMENT — ACTIVITIES OF DAILY LIVING (ADL)
HOW_WOULD_YOU_RATE_THE_OVERALL_FUNCTION_OF_YOUR_KNEE_DURING_YOUR_USUAL_DAILY_ACTIVITIES?: NORMAL
LIMPING: I DO NOT HAVE THE SYMPTOM
SIT WITH YOUR KNEE BENT: ACTIVITY IS NOT DIFFICULT
SWELLING: I DO NOT HAVE THE SYMPTOM
GO DOWN STAIRS: ACTIVITY IS NOT DIFFICULT
PAIN: I HAVE THE SYMPTOM BUT IT DOES NOT AFFECT MY ACTIVITY
STIFFNESS: I DO NOT HAVE THE SYMPTOM
GO UP STAIRS: ACTIVITY IS NOT DIFFICULT
GIVING WAY, BUCKLING OR SHIFTING OF KNEE: I DO NOT HAVE THE SYMPTOM
AS_A_RESULT_OF_YOUR_KNEE_INJURY,_HOW_WOULD_YOU_RATE_YOUR_CURRENT_LEVEL_OF_DAILY_ACTIVITY?: NORMAL
HOW_WOULD_YOU_RATE_THE_CURRENT_FUNCTION_OF_YOUR_KNEE_DURING_YOUR_USUAL_DAILY_ACTIVITIES_ON_A_SCALE_FROM_0_TO_100_WITH_100_BEING_YOUR_LEVEL_OF_KNEE_FUNCTION_PRIOR_TO_YOUR_INJURY_AND_0_BEING_THE_INABILITY_TO_PERFORM_ANY_OF_YOUR_USUAL_DAILY_ACTIVITIES?: 80
KNEEL ON THE FRONT OF YOUR KNEE: ACTIVITY IS NOT DIFFICULT
WALK: ACTIVITY IS NOT DIFFICULT
RISE FROM A CHAIR: ACTIVITY IS NOT DIFFICULT
RAW_SCORE: 69
STAND: ACTIVITY IS NOT DIFFICULT
WEAKNESS: I DO NOT HAVE THE SYMPTOM
KNEE_ACTIVITY_OF_DAILY_LIVING_SCORE: 98.57
SQUAT: ACTIVITY IS NOT DIFFICULT
KNEE_ACTIVITY_OF_DAILY_LIVING_SUM: 69

## 2021-05-24 NOTE — PROGRESS NOTES
Physical Therapy Initial Evaluation  Subjective:  The history is provided by the patient. No  was used.   Therapist Generated HPI Evaluation  Problem details: Pt was walking on 3/18/21 when he tripped and fell and landed on his L knee and R elbow, pt went to the ER where he was diagnosed with a non displaced patellar fracture, he was given a brace which he wore for 1 month; transitioned to a sleeve after that; currently he has no pain with walking or stairs, will be returning to work a [employer] tommorow without restrictions, no swelling present, MD recommended PT   .         Type of problem:  Left knee.    This is a new condition.  Condition occurred with:  A fall/slip.  Where condition occurred: in the community.  Patient reports pain:  In the joint.  Pain quality: NO PAIN      Since onset symptoms are rapidly improving.  Associated symptoms:  Loss of strength.    Special tests included:  X-ray.        Patient Health History  Rodrigo Wallace being seen for L patellar fracture .     Problem began: 3/18/2021.   Problem occurred: fell   Pain is reported as 0/10 on pain scale.  General health as reported by patient is excellent.                  Current occupation is [employer] .   Primary job tasks include:  Lifting/carrying and repetitive tasks.                                    Objective:  System                                           Hip Evaluation    Hip Strength:    Flexion:   Left: 4/5   Pain:  Right: 5/5   Pain:                    Extension:  Left: 5/5  Pain:Right: 5/5    Pain:    Abduction:  Left: 4/5     Pain:Right: 5/5    Pain:                           Knee Evaluation:  ROM:  AROM: normal  PROM: normal            Strength:     Extension:  Left: 4/5   Pain:      Right: 5/5   Pain:  Flexion:  Right: 5/5   Pain:                  Functional Testing:          Quad:    Single Leg Squat:  Left:        Painful  Femoral IR, excessive anterior knee excursion and moderate loss of control  Right:       No pain   Mild loss of control and femoral IR  Bilateral Leg Squat:  Pain B after 3 reps                General     ROS    Assessment/Plan:    Patient is a 60 year old male with left side knee complaints.    Patient has the following significant findings with corresponding treatment plan.                Diagnosis 1:  L patellar fracture   Pain -  self management, education and home program  Impaired muscle performance - neuro re-education and home program  Decreased function - therapeutic activities and home program        Cumulative Therapy Evaluation is: Low complexity.    Previous and current functional limitations:  (See Goal Flow Sheet for this information)    Short term and Long term goals: (See Goal Flow Sheet for this information)     Communication ability:  Patient appears to be able to clearly communicate and understand verbal and written communication and follow directions correctly.  Treatment Explanation - The following has been discussed with the patient:   RX ordered/plan of care  Anticipated outcomes  Possible risks and side effects  This patient would benefit from PT intervention to resume normal activities.   Rehab potential is excellent.    Frequency:  3 X a month, once daily  Duration:  for 2 months  Discharge Plan:  Achieve all LTG.  Independent in home treatment program.  Return to previous functional level by discharge.  Reach maximal therapeutic benefit.    Please refer to the daily flowsheet for treatment today, total treatment time and time spent performing 1:1 timed codes.

## 2021-06-11 ENCOUNTER — HOSPITAL ENCOUNTER (EMERGENCY)
Facility: CLINIC | Age: 61
Discharge: HOME | End: 2021-06-11
Attending: EMERGENCY MEDICINE | Admitting: EMERGENCY MEDICINE
Payer: COMMERCIAL

## 2021-06-11 VITALS
OXYGEN SATURATION: 98 % | HEART RATE: 65 BPM | SYSTOLIC BLOOD PRESSURE: 155 MMHG | TEMPERATURE: 97.1 F | DIASTOLIC BLOOD PRESSURE: 89 MMHG | RESPIRATION RATE: 18 BRPM

## 2021-06-11 DIAGNOSIS — M62.838 NECK MUSCLE SPASM: ICD-10-CM

## 2021-06-11 PROCEDURE — 99283 EMERGENCY DEPT VISIT LOW MDM: CPT | Performed by: EMERGENCY MEDICINE

## 2021-06-11 PROCEDURE — 99284 EMERGENCY DEPT VISIT MOD MDM: CPT | Performed by: EMERGENCY MEDICINE

## 2021-06-11 PROCEDURE — 250N000009 HC RX 250: Performed by: EMERGENCY MEDICINE

## 2021-06-11 PROCEDURE — 250N000013 HC RX MED GY IP 250 OP 250 PS 637: Performed by: EMERGENCY MEDICINE

## 2021-06-11 RX ORDER — IBUPROFEN 600 MG/1
600 TABLET, FILM COATED ORAL ONCE
Status: COMPLETED | OUTPATIENT
Start: 2021-06-11 | End: 2021-06-11

## 2021-06-11 RX ORDER — CYCLOBENZAPRINE HCL 10 MG
10 TABLET ORAL 3 TIMES DAILY PRN
Qty: 21 TABLET | Refills: 0 | Status: SHIPPED | OUTPATIENT
Start: 2021-06-11 | End: 2021-06-18

## 2021-06-11 RX ORDER — LIDOCAINE HYDROCHLORIDE 10 MG/ML
10 INJECTION, SOLUTION INFILTRATION; PERINEURAL ONCE
Status: COMPLETED | OUTPATIENT
Start: 2021-06-11 | End: 2021-06-11

## 2021-06-11 RX ORDER — CYCLOBENZAPRINE HCL 10 MG
10 TABLET ORAL ONCE
Status: COMPLETED | OUTPATIENT
Start: 2021-06-11 | End: 2021-06-11

## 2021-06-11 RX ORDER — IBUPROFEN 800 MG/1
800 TABLET, FILM COATED ORAL EVERY 8 HOURS PRN
Qty: 21 TABLET | Refills: 0 | Status: SHIPPED | OUTPATIENT
Start: 2021-06-11 | End: 2021-06-18

## 2021-06-11 RX ADMIN — IBUPROFEN 600 MG: 600 TABLET, FILM COATED ORAL at 09:25

## 2021-06-11 RX ADMIN — CYCLOBENZAPRINE 10 MG: 10 TABLET, FILM COATED ORAL at 09:25

## 2021-06-11 RX ADMIN — LIDOCAINE HYDROCHLORIDE 10 ML: 10 INJECTION, SOLUTION INFILTRATION; PERINEURAL at 11:40

## 2021-06-11 ASSESSMENT — ENCOUNTER SYMPTOMS
NUMBNESS: 0
NECK PAIN: 1
WEAKNESS: 0

## 2021-06-11 NOTE — ED PROVIDER NOTES
[facility] EMERGENCY DEPARTMENT ([facility])   June 11, 2021  ED 8  9:10 AM   History     Chief Complaint   Patient presents with     Neck Pain     The history is provided by the patient and medical records.     Rodrigo Wallace is a 61 year old male with history of hypertension, hyperlipidemia, prior MI status post stent x 1 in 2008 who presents with neck pain that started yesterday afternoon. He got off work and noticed left sided neck pain. He didn't think it was serious but the pain progressively worsened to point where he has limited range of motion due to pain. Pain is focal in the base of his skull/top of his cervical spine. No numbness or tingling, just sharp pain. No headache, changes in vision. He took 1g Tylenol last night without improvement. No medical problems, diabetes, hypertension. He doesn't take daily medications. No known drug allergies.       PAST MEDICAL HISTORY:   Past Medical History:   Diagnosis Date     High cholesterol      Hypertension      Myocardial infarction (H) 2008    States he has 1 sent placed.       PAST SURGICAL HISTORY:   Past Surgical History:   Procedure Laterality Date     CARDIAC SURGERY  2008    Stent place through right groin       Past medical history, past surgical history, medications, and allergies were reviewed with the patient. Additional pertinent items: None    FAMILY HISTORY: History reviewed. No pertinent family history.    SOCIAL HISTORY:   Social History     Tobacco Use     Smoking status: Former Smoker     Smokeless tobacco: Never Used     Tobacco comment: Quit smoking 2003   Substance Use Topics     Alcohol use: No     Social history was reviewed with the patient. Additional pertinent items: None      Discharge Medication List as of 6/11/2021 11:37 AM      START taking these medications    Details   cyclobenzaprine (FLEXERIL) 10 MG tablet Take 1 tablet (10 mg) by mouth 3 times daily as needed for muscle spasms, Disp-21 tablet, R-0, E-Prescribe       ibuprofen (ADVIL/MOTRIN) 800 MG tablet Take 1 tablet (800 mg) by mouth every 8 hours as needed for moderate pain, Disp-21 tablet, R-0, E-Prescribe         CONTINUE these medications which have NOT CHANGED    Details   aspirin (ASPIRIN 81) 81 MG chewable tablet Take 162-243 mg by mouth daily, Historical      atorvastatin (LIPITOR) 80 MG tablet Take 1 tablet (80 mg) by mouth daily, Disp-90 tablet, R-3, E-Prescribe      diphenhydrAMINE (BENADRYL) 25 MG capsule Take 1-2 capsules (25-50 mg) by mouth every 6 hours as needed for itching or allergies, Disp-60 capsule, R-0, Local Print      metoprolol tartrate (LOPRESSOR) 25 MG tablet Take 0.5 tablets (12.5 mg) by mouth 2 times daily, Disp-90 tablet, R-3, E-Prescribe      ticagrelor (BRILINTA) 90 MG tablet Take 1 tablet (90 mg) by mouth 2 times daily, Disp-120 tablet, R-3, E-Prescribe              No Known Allergies     Review of Systems   Musculoskeletal: Positive for neck pain.   Neurological: Negative for weakness and numbness.     A complete review of systems was performed with pertinent positives and negatives noted in the HPI, and all other systems negative.    Physical Exam   BP: 139/60  Pulse: 69  Temp: 97.1  F (36.2  C)  Resp: 18  SpO2: 100 %      Physical Exam  Vitals signs and nursing note reviewed.   Constitutional:       Appearance: Normal appearance.   HENT:      Head: Normocephalic and atraumatic.   Neck:      Musculoskeletal: Muscular tenderness present. No spinous process tenderness.   Cardiovascular:      Rate and Rhythm: Normal rate and regular rhythm.      Pulses: Normal pulses.   Pulmonary:      Effort: Pulmonary effort is normal.      Breath sounds: Normal breath sounds.   Musculoskeletal:      Right lower leg: No edema.      Left lower leg: No edema.   Skin:     General: Skin is warm and dry.      Capillary Refill: Capillary refill takes less than 2 seconds.   Neurological:      General: No focal deficit present.      Mental Status: He is alert and  oriented to person, place, and time.   Psychiatric:         Mood and Affect: Mood normal.         Behavior: Behavior normal.         ED Course        Procedures             No results found for this or any previous visit (from the past 24 hour(s)).  Medications   ibuprofen (ADVIL/MOTRIN) tablet 600 mg (600 mg Oral Given 6/11/21 0925)   cyclobenzaprine (FLEXERIL) tablet 10 mg (10 mg Oral Given 6/11/21 0925)   lidocaine 1 % injection 10 mL (10 mLs Intradermal Given 6/11/21 1140)            Assessments & Plan (with Medical Decision Making)   Patient was seen and examined. Pain is localized to the left paraspinus musculature particularly at the OA junction. No midline tenderness or neurologic deficits. Symptoms improved with ibuprofen, flexeril, and local trigger point injections. Stable for discharge with prescriptions for ibuprofen and Flexeril. Also recommended heat and topical anesthetics such as Icy Hot. Discharged in stable condition.     I have reviewed the nursing notes.    I have reviewed the findings, diagnosis, plan and need for follow up with the patient.    Discharge Medication List as of 6/11/2021 11:37 AM      START taking these medications    Details   cyclobenzaprine (FLEXERIL) 10 MG tablet Take 1 tablet (10 mg) by mouth 3 times daily as needed for muscle spasms, Disp-21 tablet, R-0, E-Prescribe      ibuprofen (ADVIL/MOTRIN) 800 MG tablet Take 1 tablet (800 mg) by mouth every 8 hours as needed for moderate pain, Disp-21 tablet, R-0, E-Prescribe             Final diagnoses:   Neck muscle spasm   I, Racquel Cruz, am serving as a trained medical scribe to document services personally performed by Anu Sandhu DO based on the provider's statements to me on June 11, 2021.  This document has been checked and approved by the attending provider.    IAnu DO, was physically present and have reviewed and verified the accuracy of this note documented by Racquel Cruz, medical scribe.        ANU  KORY RUELAS    6/11/2021   [facility] EMERGENCY DEPARTMENT     Anu Sandhu,   06/12/21 [id number]

## 2021-06-28 PROBLEM — R29.898 LEG WEAKNESS: Status: RESOLVED | Noted: 2021-05-24 | Resolved: 2021-06-28

## 2021-06-28 PROBLEM — M25.562 LEFT KNEE PAIN: Status: RESOLVED | Noted: 2021-05-24 | Resolved: 2021-06-28

## 2021-07-18 PROCEDURE — 99284 EMERGENCY DEPT VISIT MOD MDM: CPT | Performed by: EMERGENCY MEDICINE

## 2021-07-18 PROCEDURE — 99283 EMERGENCY DEPT VISIT LOW MDM: CPT | Performed by: EMERGENCY MEDICINE

## 2021-07-19 ENCOUNTER — HOSPITAL ENCOUNTER (EMERGENCY)
Facility: CLINIC | Age: 61
Discharge: HOME | End: 2021-07-19
Attending: EMERGENCY MEDICINE | Admitting: EMERGENCY MEDICINE
Payer: COMMERCIAL

## 2021-07-19 VITALS
TEMPERATURE: 97 F | DIASTOLIC BLOOD PRESSURE: 77 MMHG | BODY MASS INDEX: 26.13 KG/M2 | OXYGEN SATURATION: 100 % | RESPIRATION RATE: 16 BRPM | WEIGHT: 157 LBS | SYSTOLIC BLOOD PRESSURE: 135 MMHG | HEART RATE: 63 BPM

## 2021-07-19 DIAGNOSIS — L29.9 PRURITUS: ICD-10-CM

## 2021-07-19 LAB
ALBUMIN SERPL-MCNC: 3.4 G/DL (ref 3.4–5)
ALP SERPL-CCNC: 106 U/L (ref 40–150)
ALT SERPL W P-5'-P-CCNC: 30 U/L (ref 0–70)
ANION GAP SERPL CALCULATED.3IONS-SCNC: 8 MMOL/L (ref 3–14)
AST SERPL W P-5'-P-CCNC: 25 U/L (ref 0–45)
BASOPHILS # BLD AUTO: 0.1 10E3/UL (ref 0–0.2)
BASOPHILS NFR BLD AUTO: 1 %
BILIRUB SERPL-MCNC: 0.2 MG/DL (ref 0.2–1.3)
BUN SERPL-MCNC: 16 MG/DL (ref 7–30)
CALCIUM SERPL-MCNC: 8.4 MG/DL (ref 8.5–10.1)
CHLORIDE BLD-SCNC: 106 MMOL/L (ref 94–109)
CO2 SERPL-SCNC: 24 MMOL/L (ref 20–32)
CREAT SERPL-MCNC: 0.71 MG/DL (ref 0.66–1.25)
EOSINOPHIL # BLD AUTO: 0.7 10E3/UL (ref 0–0.7)
EOSINOPHIL NFR BLD AUTO: 10 %
ERYTHROCYTE [DISTWIDTH] IN BLOOD BY AUTOMATED COUNT: 12.9 % (ref 10–15)
GFR SERPL CREATININE-BSD FRML MDRD: >90 ML/MIN/1.73M2
GLUCOSE BLD-MCNC: 94 MG/DL (ref 70–99)
HCT VFR BLD AUTO: 39 % (ref 40–53)
HGB BLD-MCNC: 13.6 G/DL (ref 13.3–17.7)
HOLD SPECIMEN: NORMAL
IMM GRANULOCYTES # BLD: 0 10E3/UL
IMM GRANULOCYTES NFR BLD: 0 %
LYMPHOCYTES # BLD AUTO: 2.6 10E3/UL (ref 0.8–5.3)
LYMPHOCYTES NFR BLD AUTO: 36 %
MCH RBC QN AUTO: 30.2 PG (ref 26.5–33)
MCHC RBC AUTO-ENTMCNC: 34.9 G/DL (ref 31.5–36.5)
MCV RBC AUTO: 87 FL (ref 78–100)
MONOCYTES # BLD AUTO: 0.6 10E3/UL (ref 0–1.3)
MONOCYTES NFR BLD AUTO: 9 %
NEUTROPHILS # BLD AUTO: 3.3 10E3/UL (ref 1.6–8.3)
NEUTROPHILS NFR BLD AUTO: 44 %
NRBC # BLD AUTO: 0 10E3/UL
NRBC BLD AUTO-RTO: 0 /100
PLATELET # BLD AUTO: 216 10E3/UL (ref 150–450)
POTASSIUM BLD-SCNC: 3.4 MMOL/L (ref 3.4–5.3)
PROT SERPL-MCNC: 6.8 G/DL (ref 6.8–8.8)
RBC # BLD AUTO: 4.5 10E6/UL (ref 4.4–5.9)
SODIUM SERPL-SCNC: 138 MMOL/L (ref 133–144)
WBC # BLD AUTO: 7.4 10E3/UL (ref 4–11)

## 2021-07-19 PROCEDURE — 250N000013 HC RX MED GY IP 250 OP 250 PS 637: Performed by: EMERGENCY MEDICINE

## 2021-07-19 PROCEDURE — 250N000012 HC RX MED GY IP 250 OP 636 PS 637: Performed by: EMERGENCY MEDICINE

## 2021-07-19 PROCEDURE — 36415 COLL VENOUS BLD VENIPUNCTURE: CPT | Performed by: EMERGENCY MEDICINE

## 2021-07-19 PROCEDURE — 85025 COMPLETE CBC W/AUTO DIFF WBC: CPT | Performed by: EMERGENCY MEDICINE

## 2021-07-19 PROCEDURE — 36592 COLLECT BLOOD FROM PICC: CPT | Performed by: EMERGENCY MEDICINE

## 2021-07-19 PROCEDURE — 80053 COMPREHEN METABOLIC PANEL: CPT | Performed by: EMERGENCY MEDICINE

## 2021-07-19 RX ORDER — CETIRIZINE HYDROCHLORIDE 10 MG/1
10 TABLET ORAL ONCE
Status: COMPLETED | OUTPATIENT
Start: 2021-07-19 | End: 2021-07-19

## 2021-07-19 RX ORDER — CETIRIZINE HYDROCHLORIDE 10 MG/1
10 TABLET ORAL 2 TIMES DAILY PRN
Qty: 20 TABLET | Refills: 0 | Status: SHIPPED | OUTPATIENT
Start: 2021-07-19 | End: 2021-07-29

## 2021-07-19 RX ORDER — PREDNISONE 20 MG/1
20 TABLET ORAL DAILY
Qty: 5 TABLET | Refills: 0 | Status: SHIPPED | OUTPATIENT
Start: 2021-07-19

## 2021-07-19 RX ORDER — PREDNISONE 20 MG/1
20 TABLET ORAL ONCE
Status: COMPLETED | OUTPATIENT
Start: 2021-07-19 | End: 2021-07-19

## 2021-07-19 RX ADMIN — CETIRIZINE HYDROCHLORIDE 10 MG: 10 TABLET, FILM COATED ORAL at 01:09

## 2021-07-19 RX ADMIN — PREDNISONE 20 MG: 20 TABLET ORAL at 01:09

## 2021-07-19 NOTE — DISCHARGE INSTRUCTIONS
Instructions from your doctor today:  Emergency Department testing is focused on the potential causes of your symptoms that are the most dangerous possibilities, and cannot cover every possibility. Based on the evaluation, it was deemed sufficiently safe to discharge and continue management through the clinics. Thus, follow-up is very important to assess for improvement/worsening, potential further testing, and potential treatment adjustments. If you were given opioid pain medications or other medications that can make you drowsy while in the ED, you should not drive for at least several hours and not until you feel completely back to normal.     Please make an appointment to follow up with:  - Your Primary Care Provider in 2-5 days  - If you do not have a primary care provider, you can be seen in follow-up and establish care by calling any of the clinics below:     - [facility] (phone: [phone])     - [facility] / [facility] (phone: [phone])   - Have your clinic provider review the results from today's visit with you again, including any potential follow-up or additional testing that may be needed based on the results. Occasionally, incidental findings are found on later review by radiologists that may need follow-up.     Return to the Emergency Department immediately if you have fever, worsening symptoms, or any other urgent or potentially life-threatening concerns.

## 2021-07-19 NOTE — ED PROVIDER NOTES
"  History     Chief Complaint   Patient presents with     Pruritis     yesterday pruritus started on both hands, took Benadryl but then after taking the Benadryl \" I was itching all over & it has not stopped\" Per pt he has been eating same kind of food, used same bath and laundry soap, no new meds      HPI  Rodrigo Wallace is a 61 year old male with PMH notable for HTN, HLD who presents to the ED with itchiness.  Patient reports symptoms started this past morning.  It started with both of his hands being itchy, progressed to itchiness throughout the body.  Patient tried taking Benadryl but it minimally helped.  Patient denies any new medications, denies new soaps/lotions, no new environmental factors that he knows of.  Patient denies any known allergies.  No rash, no fever.    Past Medical History  Past Medical History:   Diagnosis Date     High cholesterol      Hypertension      Myocardial infarction (H) 2008    States he has 1 sent placed.     Past Surgical History:   Procedure Laterality Date     CARDIAC SURGERY  2008    Stent place through right groin     aspirin (ASPIRIN 81) 81 MG chewable tablet  cetirizine (ZYRTEC) 10 MG tablet  diphenhydrAMINE (BENADRYL) 25 MG capsule  predniSONE (DELTASONE) 20 MG tablet  atorvastatin (LIPITOR) 80 MG tablet  metoprolol tartrate (LOPRESSOR) 25 MG tablet  ticagrelor (BRILINTA) 90 MG tablet      No Known Allergies  Social History   Social History     Tobacco Use     Smoking status: Former Smoker     Smokeless tobacco: Never Used     Tobacco comment: Quit smoking 2003   Substance Use Topics     Alcohol use: No     Drug use: No      Past medical history and social history were reviewed with the patient. Additional pertinent items: None     Review of Systems  A complete review of systems was performed with pertinent positives and negatives noted in the HPI, and all other systems negative.    Physical Exam   BP: (!) 152/73  Pulse: 69  Temp: 97  F (36.1  C)  Resp: 16  Weight: 71.2 " kg (157 lb)  SpO2: 99 %    Physical Exam  General: no acute distress. Appears stated age.   HENT: MMM, no oropharyngeal lesions  Eyes: PERRL, normal sclerae  Cardio: regular rate. Regular rhythm. Extremities well perfused  Resp: Normal work of breathing, normal respiratory rate.  Neuro: alert and fully oriented. CN II-XII grossly intact. Grossly normal strength and sensation in all extremities.   MSK: no deformities. Grossly normal ROM in extremities.   Integumentary/Skin: no rash visualized, normal color  Psych: normal affect, normal behavior    ED Course      Procedures          Labs Ordered and Resulted from Time of ED Arrival Up to the Time of Departure from the ED   COMPREHENSIVE METABOLIC PANEL - Abnormal; Notable for the following components:       Result Value    Calcium 8.4 (*)     All other components within normal limits   CBC WITH PLATELETS AND DIFFERENTIAL - Abnormal; Notable for the following components:    Hematocrit 39.0 (*)     All other components within normal limits   EXTRA RED TOP TUBE   CBC WITH PLATELETS & DIFFERENTIAL    Narrative:     The following orders were created for panel order CBC with platelets differential.  Procedure                               Abnormality         Status                     ---------                               -----------         ------                     CBC with platelets and d...[[id number]]  Abnormal            Final result                 Please view results for these tests on the individual orders.   EXTRA TUBE    Narrative:     The following orders were created for panel order [place] Draw.  Procedure                               Abnormality         Status                     ---------                               -----------         ------                     Extra Red Top Tube[[id number]]                               Final result                 Please view results for these tests on the individual orders.     No orders to display           Assessments & Plan (with Medical Decision Making)   Patient presenting with diffuse itchiness. Vitals in the ED unremarkable. Nursing notes reviewed. Initial differential diagnosis includes but not limited to allergic reaction, uremia, pruritus, chemical exposure.     No rash on exam.  Electrolytes, BUN, aminotransferases, bilirubin normal.  No fever nor leukocytosis to suggest significant infection.      In the ED, the patient's symptoms were managed with cetirizine and prednisone, with improvement in symptoms upon reassessment.     The complete clinical picture is most consistent with pruritus of unclear cause. After counseling on the diagnosis, work-up, and treatment plan, the patient was discharged to home. The patient was advised to follow-up with primary care in a few days. The patient was advised to return to the ED if worsening symptoms, or if there are any urgent/life-threatening concerns.     Final diagnoses:   Pruritus     Discharge Medication List as of 7/19/2021  2:37 AM      START taking these medications    Details   cetirizine (ZYRTEC) 10 MG tablet Take 1 tablet (10 mg) by mouth 2 times daily as needed for allergies (1 tab up to twice a day as needed for itch, rash, hives, allergy), Disp-20 tablet, R-0, E-Prescribe      predniSONE (DELTASONE) 20 MG tablet Take 1 tablet (20 mg) by mouth daily, Disp-5 tablet, R-0, E-Prescribe             --  Diego Light MD   Emergency Medicine   [facility] EMERGENCY DEPARTMENT  7/18/2021     Diego Light MD  07/19/21 075

## 2022-01-17 ENCOUNTER — APPOINTMENT (OUTPATIENT)
Dept: GENERAL RADIOLOGY | Facility: CLINIC | Age: 62
End: 2022-01-17
Attending: EMERGENCY MEDICINE
Payer: COMMERCIAL

## 2022-01-17 ENCOUNTER — HOSPITAL ENCOUNTER (EMERGENCY)
Facility: CLINIC | Age: 62
Discharge: HOME | End: 2022-01-17
Attending: EMERGENCY MEDICINE | Admitting: EMERGENCY MEDICINE
Payer: COMMERCIAL

## 2022-01-17 VITALS
BODY MASS INDEX: 26.04 KG/M2 | TEMPERATURE: 98 F | HEART RATE: 71 BPM | DIASTOLIC BLOOD PRESSURE: 74 MMHG | SYSTOLIC BLOOD PRESSURE: 147 MMHG | RESPIRATION RATE: 16 BRPM | WEIGHT: 156.5 LBS | OXYGEN SATURATION: 99 %

## 2022-01-17 DIAGNOSIS — M25.561 ACUTE PAIN OF RIGHT KNEE: ICD-10-CM

## 2022-01-17 PROCEDURE — 250N000011 HC RX IP 250 OP 636: Performed by: EMERGENCY MEDICINE

## 2022-01-17 PROCEDURE — 73562 X-RAY EXAM OF KNEE 3: CPT | Mod: RT

## 2022-01-17 PROCEDURE — 99284 EMERGENCY DEPT VISIT MOD MDM: CPT | Performed by: EMERGENCY MEDICINE

## 2022-01-17 PROCEDURE — 96372 THER/PROPH/DIAG INJ SC/IM: CPT | Performed by: EMERGENCY MEDICINE

## 2022-01-17 RX ORDER — KETOROLAC TROMETHAMINE 30 MG/ML
30 INJECTION, SOLUTION INTRAMUSCULAR; INTRAVENOUS ONCE
Status: COMPLETED | OUTPATIENT
Start: 2022-01-17 | End: 2022-01-17

## 2022-01-17 RX ADMIN — KETOROLAC TROMETHAMINE 30 MG: 30 INJECTION, SOLUTION INTRAMUSCULAR; INTRAVENOUS at 18:36
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
## 2022-01-18 NOTE — DISCHARGE INSTRUCTIONS
Please make an appointment to follow up with Your Primary Care Provider as soon as possible.    Can continue to take ibuprofen or tylenol at home for pain.     Return for any new or worsening concerns.

## 2022-08-22 ENCOUNTER — APPOINTMENT (OUTPATIENT)
Dept: GENERAL RADIOLOGY | Facility: CLINIC | Age: 62
End: 2022-08-22
Attending: EMERGENCY MEDICINE
Payer: COMMERCIAL

## 2022-08-22 ENCOUNTER — HOSPITAL ENCOUNTER (EMERGENCY)
Facility: CLINIC | Age: 62
Discharge: HOME | End: 2022-08-22
Attending: EMERGENCY MEDICINE | Admitting: EMERGENCY MEDICINE
Payer: COMMERCIAL

## 2022-08-22 VITALS
DIASTOLIC BLOOD PRESSURE: 87 MMHG | RESPIRATION RATE: 16 BRPM | HEART RATE: 59 BPM | TEMPERATURE: 98.5 F | OXYGEN SATURATION: 100 % | WEIGHT: 153 LBS | SYSTOLIC BLOOD PRESSURE: 144 MMHG | BODY MASS INDEX: 25.46 KG/M2

## 2022-08-22 DIAGNOSIS — S92.514A CLOSED NONDISPLACED FRACTURE OF PROXIMAL PHALANX OF LESSER TOE OF RIGHT FOOT, INITIAL ENCOUNTER: ICD-10-CM

## 2022-08-22 PROCEDURE — 99284 EMERGENCY DEPT VISIT MOD MDM: CPT | Mod: 25 | Performed by: EMERGENCY MEDICINE

## 2022-08-22 PROCEDURE — 28510 TREATMENT OF TOE FRACTURE: CPT | Mod: T8 | Performed by: EMERGENCY MEDICINE

## 2022-08-22 PROCEDURE — 99283 EMERGENCY DEPT VISIT LOW MDM: CPT | Mod: 25 | Performed by: EMERGENCY MEDICINE

## 2022-08-22 PROCEDURE — 73630 X-RAY EXAM OF FOOT: CPT | Mod: RT

## 2022-08-22 NOTE — ED PROVIDER NOTES
ED Provider Note  [facility] EMERGENCY DEPARTMENT ([facility])    8/22/22          History     Chief Complaint   Patient presents with     Toe Pain     Reports on Friday he was walking and hurt his right 4th toe when it came into contact with a corner     HPI  Rodrigo Wallace is a 62 year old male with past medical history significant for MI s/p stent placement (2008), hypertension, and hyperlipidemia who presents to the ED for evaluation of right fourth toe pain.  Patient reports that 3 days ago he was chasing a child and hurt his right fourth toe when it came into contact with a corner.  States that he was barefoot.  The toe continues to hurt.  He is unable to wear boots due to the pain.  He has not broken his toe before.  He is not taking anything OTC for the pain.  He denies diabetes or other medical issues.      Past Medical History  Past Medical History:   Diagnosis Date     High cholesterol      Hypertension      Myocardial infarction (H) 2008    States he has 1 sent placed.     Past Surgical History:   Procedure Laterality Date     CARDIAC SURGERY  2008    Stent place through right groin     aspirin (ASPIRIN 81) 81 MG chewable tablet  atorvastatin (LIPITOR) 80 MG tablet  diphenhydrAMINE (BENADRYL) 25 MG capsule  metoprolol tartrate (LOPRESSOR) 25 MG tablet  predniSONE (DELTASONE) 20 MG tablet  ticagrelor (BRILINTA) 90 MG tablet      No Known Allergies  Family History  No family history on file.  Social History   Social History     Tobacco Use     Smoking status: Former Smoker     Smokeless tobacco: Never Used     Tobacco comment: Quit smoking 2003   Substance Use Topics     Alcohol use: No     Drug use: No      Past medical history, past surgical history, medications, allergies, family history, and social history were reviewed with the patient. No additional pertinent items.       Review of Systems   Musculoskeletal: Positive for gait problem.   All other  systems reviewed and are negative.    A complete review of systems was performed with pertinent positives and negatives noted in the HPI, and all other systems negative.    Physical Exam   BP: 138/76  Pulse: 79  Temp: 98.7  F (37.1  C)  Resp: 16  Weight: 69.4 kg (153 lb)  SpO2: 97 %  Physical Exam  Vitals and nursing note reviewed.   Constitutional:       Appearance: He is normal weight.   HENT:      Head: Normocephalic and atraumatic.      Nose: No congestion or rhinorrhea.   Eyes:      Extraocular Movements: Extraocular movements intact.   Cardiovascular:      Rate and Rhythm: Normal rate.   Pulmonary:      Effort: Pulmonary effort is normal.   Musculoskeletal:         General: Signs of injury (tender base of right foot 4th toe. ) present.      Cervical back: Normal range of motion.   Skin:     Coloration: Skin is not jaundiced or pale.      Findings: No bruising, erythema, lesion or rash.   Neurological:      General: No focal deficit present.      Mental Status: He is alert and oriented to person, place, and time.   Psychiatric:         Mood and Affect: Mood normal.         ED Course     1:29 PM  The patient was seen and examined by Lisy Carl MD in Room ED04.     Procedures       The medical record was reviewed and interpreted.  Current images reviewed and interpreted: foot xray right.              Results for orders placed or performed during the hospital encounter of 08/22/22   Foot  XR, G/E 3 views, right     Status: None    Narrative    XR RIGHT FOOT THREE OR MORE VIEWS   8/22/2022 1:55 PM     HISTORY:  4th toe pain after injury, evaluate for fracture.    COMPARISON: None.      Impression    IMPRESSION:  1. There is a nondisplaced oblique fracture in the shaft of the  proximal phalanx of the fourth toe seen best on the lateral view. No  articular surface involvement. No angulation.  2. Mild osteoarthrosis of the first MTP joint.    FRED VERA MD         SYSTEM ID:  [id number]     Medications - No  data to display     Assessments & Plan (with Medical Decision Making)   The patient presents with pain for 3 -4 days after hitting it on Friday.  He has pain of the proximal toe phalanx of 4th toe.  Imaging shows a non displaced fracture of 4th toe.  Budding tape was done and he was given a post op shoe.  Follow up with pmd if concerns but should improve with time.     I have reviewed the nursing notes. I have reviewed the findings, diagnosis, plan and need for follow up with the patient.    New Prescriptions    No medications on file       Final diagnoses:   Closed nondisplaced fracture of proximal phalanx of lesser toe of right foot, initial encounter     I, Chelle Loving, am serving as a trained medical scribe to document services personally performed by Lisy Carl MD based on the provider's statements to me on August 22, 2022.  This document has been checked and approved by the attending provider.    I, Lisy Carl MD, was physically present and have reviewed and verified the accuracy of this note documented by Chelle Loving, medical scribe.      --  Lisy Carl MD  [facility] EMERGENCY DEPARTMENT  8/22/2022     Lisy Carl MD  08/22/22 8200

## 2022-08-22 NOTE — DISCHARGE INSTRUCTIONS
Wear post op shoe for comfort to avoid rolling your weight onto your broken toe.      Elliot tape to help support broken toe.     Follow up with your primary care doctor for concerns.  The pain should improve each week.  Allow pain to be your guide for activities.  If it is causing you a lot of pain, you should avoid that activity for another week.

## 2022-08-22 NOTE — ED TRIAGE NOTES
Triage Assessment     Row Name 08/22/22 1127       Triage Assessment (Adult)    Airway WDL WDL       Respiratory WDL    Respiratory WDL WDL       Cardiac WDL    Cardiac WDL WDL

## 2023-08-01 ENCOUNTER — HOSPITAL ENCOUNTER (EMERGENCY)
Facility: CLINIC | Age: 63
Discharge: HOME | End: 2023-08-01
Attending: EMERGENCY MEDICINE | Admitting: EMERGENCY MEDICINE
Payer: COMMERCIAL

## 2023-08-01 ENCOUNTER — TELEPHONE (OUTPATIENT)
Dept: ORTHOPEDICS | Facility: CLINIC | Age: 63
End: 2023-08-01

## 2023-08-01 ENCOUNTER — APPOINTMENT (OUTPATIENT)
Dept: GENERAL RADIOLOGY | Facility: CLINIC | Age: 63
End: 2023-08-01
Attending: EMERGENCY MEDICINE
Payer: COMMERCIAL

## 2023-08-01 VITALS
WEIGHT: 152.3 LBS | RESPIRATION RATE: 16 BRPM | HEIGHT: 66 IN | SYSTOLIC BLOOD PRESSURE: 154 MMHG | BODY MASS INDEX: 24.48 KG/M2 | TEMPERATURE: 97.7 F | HEART RATE: 60 BPM | DIASTOLIC BLOOD PRESSURE: 80 MMHG

## 2023-08-01 DIAGNOSIS — M25.511 ACUTE PAIN OF RIGHT SHOULDER: ICD-10-CM

## 2023-08-01 PROCEDURE — 99283 EMERGENCY DEPT VISIT LOW MDM: CPT | Performed by: EMERGENCY MEDICINE

## 2023-08-01 PROCEDURE — 250N000013 HC RX MED GY IP 250 OP 250 PS 637: Performed by: EMERGENCY MEDICINE

## 2023-08-01 PROCEDURE — 73030 X-RAY EXAM OF SHOULDER: CPT | Mod: RT

## 2023-08-01 RX ORDER — IBUPROFEN 600 MG/1
600 TABLET, FILM COATED ORAL ONCE
Status: COMPLETED | OUTPATIENT
Start: 2023-08-01 | End: 2023-08-01

## 2023-08-01 RX ADMIN — IBUPROFEN 600 MG: 600 TABLET ORAL at 06:12

## 2023-08-01 ASSESSMENT — ACTIVITIES OF DAILY LIVING (ADL): ADLS_ACUITY_SCORE: 35

## 2023-08-01 NOTE — Clinical Note
Rodrigo Wallace was seen and treated in our emergency department on 8/1/2023.  He may return to work on 08/08/2023.       If you have any questions or concerns, please don't hesitate to call.      Toby Eli MD

## 2023-08-01 NOTE — DISCHARGE INSTRUCTIONS
Please make an appointment to follow up with Your Primary Care Provider in 3-5 days if not improving.    Take your arm out of the sling and perform pendulum swings 3-4 times per day    If Rodrigo has discomfort from fever or other pain, he can have:  Acetaminophen (Tylenol) every 6 hours as needed. His dose is:    2 regular strength tabs (650 mg)                                     (43.2+ kg/96+ lb)    NOTE: If your acetaminophen (Tylenol) came with a dropper marked with 0.4 and 0.8 ml, call us ([phone]) or check with your doctor about the dose before using it.     AND/OR      Ibuprofen (Advil, Motrin) every 6 hours as needed. His/her dose is:    3 regular strength tabs (600 mg)                                                                         (60-80 kg/132-176 lb)

## 2023-08-01 NOTE — ED PROVIDER NOTES
"ED Provider Note  [facility]      History     Chief Complaint   Patient presents with    Shoulder Pain     Right shoulder pain.      HPI  Rodrigo Wallace is a 63 year old male who is presenting with right shoulder pain.  He works for [employer] is lifting all day yesterday and it started during his shift.  Denies chest pain, shortness of breath, nausea, vomiting, diaphoresis or exertional worsening.  Hurts to lift his right arm.  No swelling of the arm.  Has had surgery on the shoulder in the past he states.  He is not having medications for pain.  Denies significant swelling.  Hurts mainly to left above 90 degrees.  Pain over the humerus, elbow or forearm.    Past Medical History  Past Medical History:   Diagnosis Date    High cholesterol     Hypertension     Myocardial infarction (H) 2008    States he has 1 sent placed.     Past Surgical History:   Procedure Laterality Date    CARDIAC SURGERY  2008    Stent place through right groin     aspirin (ASPIRIN 81) 81 MG chewable tablet  atorvastatin (LIPITOR) 80 MG tablet  diphenhydrAMINE (BENADRYL) 25 MG capsule  metoprolol tartrate (LOPRESSOR) 25 MG tablet  predniSONE (DELTASONE) 20 MG tablet  ticagrelor (BRILINTA) 90 MG tablet      No Known Allergies  Family History  History reviewed. No pertinent family history.  Social History   Social History     Tobacco Use    Smoking status: Former    Smokeless tobacco: Never    Tobacco comments:     Quit smoking 2003   Substance Use Topics    Alcohol use: No    Drug use: No         A medically appropriate review of systems was performed with pertinent positives and negatives noted in the HPI, and all other systems negative.    Physical Exam   BP: (!) 150/72  Pulse: 67  Temp: 98.3  F (36.8  C)  Resp: 18  Height: 167.6 cm (5' 6\")  Weight: 69.1 kg (152 lb 4.8 oz)  Physical Exam  Physical Exam   Constitutional: oriented to person, place, and time. appears well-developed and well-nourished.   HENT:   Head: " Normocephalic and atraumatic.   Neck: Normal range of motion.   Pulmonary/Chest: Effort normal. No respiratory distress.   Cardiac: No murmurs, rubs, gallops. RRR.  Abdominal: Abdomen soft, nontender, nondistended. No rebound tenderness.  MSK: Long bones without deformity or evidence of trauma.  Tender to palpation over the right shoulder around the AC area, no obvious step-offs.  No tenderness over the clavicles.  Range of motion full passively but with pain after flexion past 90 degrees.  There is pain with external rotation and active resistance to external rotation.  Range of motion otherwise full.  No significant swelling or warmth over the joint.  No tenderness over the humerus or elbow of the right side.  Radial pulses 2+ and symmetric.  Neurological: alert and oriented to person, place, and time.   Skin: Skin is warm and dry.   Psychiatric:  normal mood and affect.  behavior is normal. Thought content normal.       ED Course, Procedures, & Data      Procedures              No results found for any visits on 08/01/23.  Medications   ibuprofen (ADVIL/MOTRIN) tablet 600 mg (has no administration in time range)     Labs Ordered and Resulted from Time of ED Arrival to Time of ED Departure - No data to display  No orders to display          Critical care was not performed.     Medical Decision Making  The patient's presentation was of low complexity (an acute and uncomplicated illness or injury).    The patient's evaluation involved:  ordering and/or review of 1 test(s) in this encounter (see separate area of note for details)    The patient's management necessitated further care after sign-out to Morning physician (see their note for further management).    Assessment & Plan    MDM  Patient presenting with shoulder pain after repeated lifting during his job.  He does have some pain with external rotation and past 90 degrees of flexion suspicious for rotator cuff injury.  No signs or symptoms of septic arthritis.   No signs or symptoms of dislocation or fracture.  We will get an x-ray.  Patient denies any injuries.  He denies any other cardiorespiratory complaints.  This seems like a fairly straightforward musculoskeletal injury.  Anticipate discharge with follow-up with primary care provider and conservative pain measures.    X-ray reports suspicious for rotator cuff tear which is likely clinically.  He is given a sling, discussed pendulum swings and follow-up with orthopedic surgery.  Patient agrees with this plan.    I have reviewed the nursing notes. I have reviewed the findings, diagnosis, plan and need for follow up with the patient.    New Prescriptions    No medications on file       Final diagnoses:   Acute pain of right shoulder       Toby Eli  [facility] EMERGENCY DEPARTMENT  8/1/2023     Toby Eli MD  08/01/23 0649       Toby Eli MD  08/01/23 0719

## 2023-08-01 NOTE — ED TRIAGE NOTES
Pt reports right shoulder pain starting 7/31 after lifting that has been progressively getting worse.    Triage Assessment       Row Name 08/01/23 0551       Triage Assessment (Adult)    Airway WDL WDL       Respiratory WDL    Respiratory WDL WDL       Skin Circulation/Temperature WDL    Skin Circulation/Temperature WDL WDL       Cardiac WDL    Cardiac WDL WDL       Peripheral/Neurovascular WDL    Peripheral Neurovascular WDL WDL       Cognitive/Neuro/Behavioral WDL    Cognitive/Neuro/Behavioral WDL WDL       Jassi Coma Scale    Best Eye Response 4-->(E4) spontaneous    Best Motor Response 6-->(M6) obeys commands    Best Verbal Response 5-->(V5) oriented    Jassi Coma Scale Score 15

## 2025-01-26 ENCOUNTER — HOSPITAL ENCOUNTER (EMERGENCY)
Facility: CLINIC | Age: 65
Discharge: HOME | End: 2025-01-26
Attending: EMERGENCY MEDICINE | Admitting: EMERGENCY MEDICINE
Payer: COMMERCIAL

## 2025-01-26 ENCOUNTER — APPOINTMENT (OUTPATIENT)
Dept: GENERAL RADIOLOGY | Facility: CLINIC | Age: 65
End: 2025-01-26
Attending: EMERGENCY MEDICINE
Payer: COMMERCIAL

## 2025-01-26 VITALS
BODY MASS INDEX: 24.53 KG/M2 | HEART RATE: 74 BPM | SYSTOLIC BLOOD PRESSURE: 138 MMHG | RESPIRATION RATE: 16 BRPM | WEIGHT: 152 LBS | DIASTOLIC BLOOD PRESSURE: 62 MMHG | TEMPERATURE: 97.6 F | OXYGEN SATURATION: 98 %

## 2025-01-26 DIAGNOSIS — R10.13 EPIGASTRIC PAIN: ICD-10-CM

## 2025-01-26 LAB
ALBUMIN SERPL BCG-MCNC: 4.3 G/DL (ref 3.5–5.2)
ALP SERPL-CCNC: 89 U/L (ref 40–150)
ALT SERPL W P-5'-P-CCNC: 16 U/L (ref 0–70)
ANION GAP SERPL CALCULATED.3IONS-SCNC: 11 MMOL/L (ref 7–15)
AST SERPL W P-5'-P-CCNC: 32 U/L (ref 0–45)
BASOPHILS # BLD AUTO: 0.1 10E3/UL (ref 0–0.2)
BASOPHILS NFR BLD AUTO: 1 %
BILIRUB SERPL-MCNC: 0.2 MG/DL
BUN SERPL-MCNC: 21 MG/DL (ref 8–23)
BURR CELLS BLD QL SMEAR: SLIGHT
CALCIUM SERPL-MCNC: 7.9 MG/DL (ref 8.8–10.4)
CHLORIDE SERPL-SCNC: 103 MMOL/L (ref 98–107)
CREAT SERPL-MCNC: 0.68 MG/DL (ref 0.67–1.17)
EGFRCR SERPLBLD CKD-EPI 2021: >90 ML/MIN/1.73M2
EOSINOPHIL # BLD AUTO: 0.5 10E3/UL (ref 0–0.7)
EOSINOPHIL NFR BLD AUTO: 7 %
ERYTHROCYTE [DISTWIDTH] IN BLOOD BY AUTOMATED COUNT: 12.7 % (ref 10–15)
GLUCOSE SERPL-MCNC: 96 MG/DL (ref 70–99)
HCO3 SERPL-SCNC: 25 MMOL/L (ref 22–29)
HCT VFR BLD AUTO: 40.8 % (ref 40–53)
HGB BLD-MCNC: 14 G/DL (ref 13.3–17.7)
IMM GRANULOCYTES # BLD: 0 10E3/UL
IMM GRANULOCYTES NFR BLD: 1 %
LIPASE SERPL-CCNC: 24 U/L (ref 13–60)
LYMPHOCYTES # BLD AUTO: 1.7 10E3/UL (ref 0.8–5.3)
LYMPHOCYTES NFR BLD AUTO: 27 %
MCH RBC QN AUTO: 29 PG (ref 26.5–33)
MCHC RBC AUTO-ENTMCNC: 34.3 G/DL (ref 31.5–36.5)
MCV RBC AUTO: 85 FL (ref 78–100)
MONOCYTES # BLD AUTO: 0.5 10E3/UL (ref 0–1.3)
MONOCYTES NFR BLD AUTO: 9 %
NEUTROPHILS # BLD AUTO: 3.5 10E3/UL (ref 1.6–8.3)
NEUTROPHILS NFR BLD AUTO: 55 %
NRBC # BLD AUTO: 0 10E3/UL
NRBC BLD AUTO-RTO: 0 /100
PLAT MORPH BLD: ABNORMAL
PLATELET # BLD AUTO: 134 10E3/UL (ref 150–450)
POTASSIUM SERPL-SCNC: 4.6 MMOL/L (ref 3.4–5.3)
PROT SERPL-MCNC: 7.1 G/DL (ref 6.4–8.3)
RBC # BLD AUTO: 4.83 10E6/UL (ref 4.4–5.9)
RBC MORPH BLD: ABNORMAL
SODIUM SERPL-SCNC: 139 MMOL/L (ref 135–145)
TROPONIN T SERPL HS-MCNC: 9 NG/L
WBC # BLD AUTO: 6.4 10E3/UL (ref 4–11)

## 2025-01-26 PROCEDURE — 250N000011 HC RX IP 250 OP 636: Performed by: EMERGENCY MEDICINE

## 2025-01-26 PROCEDURE — 99284 EMERGENCY DEPT VISIT MOD MDM: CPT | Performed by: EMERGENCY MEDICINE

## 2025-01-26 PROCEDURE — 250N000013 HC RX MED GY IP 250 OP 250 PS 637: Performed by: EMERGENCY MEDICINE

## 2025-01-26 PROCEDURE — 71046 X-RAY EXAM CHEST 2 VIEWS: CPT

## 2025-01-26 PROCEDURE — 99285 EMERGENCY DEPT VISIT HI MDM: CPT | Mod: 25 | Performed by: EMERGENCY MEDICINE

## 2025-01-26 PROCEDURE — 84484 ASSAY OF TROPONIN QUANT: CPT | Performed by: EMERGENCY MEDICINE

## 2025-01-26 PROCEDURE — 250N000009 HC RX 250: Performed by: EMERGENCY MEDICINE

## 2025-01-26 PROCEDURE — 96374 THER/PROPH/DIAG INJ IV PUSH: CPT | Performed by: EMERGENCY MEDICINE

## 2025-01-26 PROCEDURE — 36415 COLL VENOUS BLD VENIPUNCTURE: CPT | Performed by: EMERGENCY MEDICINE

## 2025-01-26 PROCEDURE — 80053 COMPREHEN METABOLIC PANEL: CPT | Performed by: EMERGENCY MEDICINE

## 2025-01-26 PROCEDURE — 93005 ELECTROCARDIOGRAM TRACING: CPT | Performed by: EMERGENCY MEDICINE

## 2025-01-26 PROCEDURE — 85025 COMPLETE CBC W/AUTO DIFF WBC: CPT | Performed by: EMERGENCY MEDICINE

## 2025-01-26 PROCEDURE — 83690 ASSAY OF LIPASE: CPT | Performed by: EMERGENCY MEDICINE

## 2025-01-26 PROCEDURE — 93010 ELECTROCARDIOGRAM REPORT: CPT | Performed by: EMERGENCY MEDICINE

## 2025-01-26 RX ORDER — PANTOPRAZOLE SODIUM 20 MG/1
20 TABLET, DELAYED RELEASE ORAL DAILY
Qty: 30 TABLET | Refills: 0 | Status: SHIPPED | OUTPATIENT
Start: 2025-01-26

## 2025-01-26 RX ORDER — LIDOCAINE HYDROCHLORIDE 20 MG/ML
10 SOLUTION OROPHARYNGEAL ONCE
Status: COMPLETED | OUTPATIENT
Start: 2025-01-26 | End: 2025-01-26

## 2025-01-26 RX ORDER — MAGNESIUM HYDROXIDE/ALUMINUM HYDROXICE/SIMETHICONE 120; 1200; 1200 MG/30ML; MG/30ML; MG/30ML
15 SUSPENSION ORAL ONCE
Status: COMPLETED | OUTPATIENT
Start: 2025-01-26 | End: 2025-01-26

## 2025-01-26 RX ADMIN — ALUMINUM HYDROXIDE, MAGNESIUM HYDROXIDE, AND SIMETHICONE 15 ML: 200; 200; 20 SUSPENSION ORAL at 17:08

## 2025-01-26 RX ADMIN — FAMOTIDINE 20 MG: 10 INJECTION, SOLUTION INTRAVENOUS at 17:08

## 2025-01-26 RX ADMIN — LIDOCAINE HYDROCHLORIDE 10 ML: 20 SOLUTION ORAL at 17:08

## 2025-01-26 ASSESSMENT — COLUMBIA-SUICIDE SEVERITY RATING SCALE - C-SSRS
6. HAVE YOU EVER DONE ANYTHING, STARTED TO DO ANYTHING, OR PREPARED TO DO ANYTHING TO END YOUR LIFE?: NO
2. HAVE YOU ACTUALLY HAD ANY THOUGHTS OF KILLING YOURSELF IN THE PAST MONTH?: NO
1. IN THE PAST MONTH, HAVE YOU WISHED YOU WERE DEAD OR WISHED YOU COULD GO TO SLEEP AND NOT WAKE UP?: NO

## 2025-01-26 ASSESSMENT — ACTIVITIES OF DAILY LIVING (ADL)
ADLS_ACUITY_SCORE: 41

## 2025-01-26 NOTE — ED PROVIDER NOTES
ED Provider Note  [facility]      History     Chief Complaint   Patient presents with    Chest Pain     Burning pain and throwing up, started two days ago.     HPI  Rodrigo Wallace is a 64 year old male with a history of HLD, NSTEMI in 2006, Coronary stenting in 2018, and GERD who presents to the emergency department for burning epigastric  pain for 2 days. Patient says he vomited twice today and has been experiencing weakness. Patient says this feels similar to heartburn but this episode was the worst that he's experienced. The pain does not radiate and does not become worse with activity. Denies shortness of breath and ankle edema. Takes Asprin 81mg daily and no other blood thinners. Does not take medication for GERD. Does not feel similar to previous symptoms he had when he had his stents in 2018. Last BM was yesterday and was normal.     Past Medical History:   Diagnosis Date    High cholesterol     Hypertension     Myocardial infarction (H) 2008    States he has 1 sent placed.       Past Surgical History:   Procedure Laterality Date    CARDIAC SURGERY  2008    Stent place through right groin       History reviewed. No pertinent family history.    Social History     Tobacco Use    Smoking status: Former    Smokeless tobacco: Never    Tobacco comments:     Quit smoking 2003   Substance Use Topics    Alcohol use: No         Past Medical History  Past Medical History:   Diagnosis Date    High cholesterol     Hypertension     Myocardial infarction (H) 2008    States he has 1 sent placed.     Past Surgical History:   Procedure Laterality Date    CARDIAC SURGERY  2008    Stent place through right groin     aspirin (ASPIRIN 81) 81 MG chewable tablet  atorvastatin (LIPITOR) 80 MG tablet  diphenhydrAMINE (BENADRYL) 25 MG capsule  metoprolol tartrate (LOPRESSOR) 25 MG tablet  pantoprazole (PROTONIX) 20 MG EC tablet  predniSONE (DELTASONE) 20 MG tablet  ticagrelor (BRILINTA) 90 MG  tablet      No Known Allergies  Family History  History reviewed. No pertinent family history.  Social History   Social History     Tobacco Use    Smoking status: Former    Smokeless tobacco: Never    Tobacco comments:     Quit smoking 2003   Substance Use Topics    Alcohol use: No    Drug use: No      A medically appropriate review of systems was performed with pertinent positives and negatives noted in the HPI, and all other systems negative.    Physical Exam   BP: (!) 150/59  Pulse: 76  Temp: 97.6  F (36.4  C)  Resp: 16  Weight: 68.9 kg (152 lb)  SpO2: 97 %  Physical Exam  Vitals and nursing note reviewed.   Constitutional:       General: He is not in acute distress.     Appearance: He is not diaphoretic.      Comments: Adult male, alert, cooperative, NAD   HENT:      Head: Atraumatic.      Mouth/Throat:      Mouth: Mucous membranes are moist.      Pharynx: Oropharynx is clear. No oropharyngeal exudate.   Eyes:      General: No scleral icterus.     Pupils: Pupils are equal, round, and reactive to light.   Neck:      Vascular: No JVD.   Cardiovascular:      Rate and Rhythm: Normal rate and regular rhythm.      Pulses: Normal pulses.      Heart sounds: Normal heart sounds. No murmur heard.  Pulmonary:      Effort: Pulmonary effort is normal. No respiratory distress.      Breath sounds: Normal breath sounds.   Abdominal:      General: Bowel sounds are normal.      Palpations: Abdomen is soft.      Tenderness: There is abdominal tenderness.      Comments: Abd is soft, mild epigastric TTP without guarding or rebound. Normal bowel sounds   Musculoskeletal:         General: No tenderness.      Right lower leg: No edema.      Left lower leg: No edema.   Skin:     General: Skin is warm.      Findings: No rash.   Neurological:      General: No focal deficit present.      Mental Status: He is alert.   Psychiatric:         Mood and Affect: Mood normal.         Behavior: Behavior normal.         ED Course, Procedures, & Data       Procedures            EKG Interpretation:      Interpreted by Kathe Feng MD  Time reviewed:1630   Symptoms at time of EKG: epigastric pain   Rhythm: normal sinus   Rate: normal  Axis: NORMAL  Ectopy: none  Conduction: normal  ST Segments/ T Waves: No ST-T wave changes  Q Waves: none  Comparison to prior: Unchanged    Clinical Impression: normal EKG            Results for orders placed or performed during the hospital encounter of 01/26/25   XR Chest 2 Views     Status: None    Narrative    EXAM: XR CHEST 2 VIEWS  LOCATION: [facility]  DATE: 1/26/2025    INDICATION: chest pain  COMPARISON: 12/6/2020      Impression    IMPRESSION: Normal heart size and pulmonary vascularity. No focal infiltrates. No pleural effusions. Postoperative changes in the right shoulder.   Troponin T, High Sensitivity     Status: Normal   Result Value Ref Range    Troponin T, High Sensitivity 9 <=22 ng/L   Comprehensive metabolic panel     Status: Abnormal   Result Value Ref Range    Sodium 139 135 - 145 mmol/L    Potassium 4.6 3.4 - 5.3 mmol/L    Carbon Dioxide (CO2) 25 22 - 29 mmol/L    Anion Gap 11 7 - 15 mmol/L    Urea Nitrogen 21.0 8.0 - 23.0 mg/dL    Creatinine 0.68 0.67 - 1.17 mg/dL    GFR Estimate >90 >60 mL/min/1.73m2    Calcium 7.9 (L) 8.8 - 10.4 mg/dL    Chloride 103 98 - 107 mmol/L    Glucose 96 70 - 99 mg/dL    Alkaline Phosphatase 89 40 - 150 U/L    AST 32 0 - 45 U/L    ALT 16 0 - 70 U/L    Protein Total 7.1 6.4 - 8.3 g/dL    Albumin 4.3 3.5 - 5.2 g/dL    Bilirubin Total 0.2 <=1.2 mg/dL   Lipase     Status: Normal   Result Value Ref Range    Lipase 24 13 - 60 U/L   CBC with platelets and differential     Status: Abnormal   Result Value Ref Range    WBC Count 6.4 4.0 - 11.0 10e3/uL    RBC Count 4.83 4.40 - 5.90 10e6/uL    Hemoglobin 14.0 13.3 - 17.7 g/dL    Hematocrit 40.8 40.0 - 53.0 %    MCV 85 78 - 100 fL    MCH 29.0 26.5 - 33.0 pg    MCHC 34.3 31.5 - 36.5 g/dL    RDW  12.7 10.0 - 15.0 %    Platelet Count 134 (L) 150 - 450 10e3/uL    % Neutrophils 55 %    % Lymphocytes 27 %    % Monocytes 9 %    % Eosinophils 7 %    % Basophils 1 %    % Immature Granulocytes 1 %    NRBCs per 100 WBC 0 <1 /100    Absolute Neutrophils 3.5 1.6 - 8.3 10e3/uL    Absolute Lymphocytes 1.7 0.8 - 5.3 10e3/uL    Absolute Monocytes 0.5 0.0 - 1.3 10e3/uL    Absolute Eosinophils 0.5 0.0 - 0.7 10e3/uL    Absolute Basophils 0.1 0.0 - 0.2 10e3/uL    Absolute Immature Granulocytes 0.0 <=0.4 10e3/uL    Absolute NRBCs 0.0 10e3/uL   RBC and Platelet Morphology     Status: Abnormal   Result Value Ref Range    RBC Morphology Confirmed RBC Indices     Platelet Assessment  Automated Count Confirmed. Platelet morphology is normal.     Automated Count Confirmed. Platelet morphology is normal.    [place] Cells Slight (A) None Seen   EKG 12 lead     Status: None (Preliminary result)   Result Value Ref Range    Systolic Blood Pressure  mmHg    Diastolic Blood Pressure  mmHg    Ventricular Rate 64 BPM    Atrial Rate 64 BPM    UT Interval 182 ms    QRS Duration 86 ms     ms    QTc 414 ms    P Axis 77 degrees    R AXIS 16 degrees    T Axis 4 degrees    Interpretation ECG Sinus rhythm  Normal ECG      CBC with platelets differential     Status: Abnormal    Narrative    The following orders were created for panel order CBC with platelets differential.  Procedure                               Abnormality         Status                     ---------                               -----------         ------                     CBC with platelets and d...[[id number]]  Abnormal            Final result               RBC and Platelet Morphology[[id number]]  Abnormal            Final result                 Please view results for these tests on the individual orders.     Medications   alum & mag hydroxide-simethicone (MAALOX) suspension 15 mL (15 mLs Oral $Given 1/26/25 4450)   lidocaine (viscous) (XYLOCAINE) 2 % solution 10 mL (10 mLs  Mouth/Throat $Given 1/26/25 1708)   famotidine (PEPCID) injection 20 mg (20 mg Intravenous $Given 1/26/25 1708)     Labs Ordered and Resulted from Time of ED Arrival to Time of ED Departure   COMPREHENSIVE METABOLIC PANEL - Abnormal       Result Value    Sodium 139      Potassium 4.6      Carbon Dioxide (CO2) 25      Anion Gap 11      Urea Nitrogen 21.0      Creatinine 0.68      GFR Estimate >90      Calcium 7.9 (*)     Chloride 103      Glucose 96      Alkaline Phosphatase 89      AST 32      ALT 16      Protein Total 7.1      Albumin 4.3      Bilirubin Total 0.2     CBC WITH PLATELETS AND DIFFERENTIAL - Abnormal    WBC Count 6.4      RBC Count 4.83      Hemoglobin 14.0      Hematocrit 40.8      MCV 85      MCH 29.0      MCHC 34.3      RDW 12.7      Platelet Count 134 (*)     % Neutrophils 55      % Lymphocytes 27      % Monocytes 9      % Eosinophils 7      % Basophils 1      % Immature Granulocytes 1      NRBCs per 100 WBC 0      Absolute Neutrophils 3.5      Absolute Lymphocytes 1.7      Absolute Monocytes 0.5      Absolute Eosinophils 0.5      Absolute Basophils 0.1      Absolute Immature Granulocytes 0.0      Absolute NRBCs 0.0     RBC AND PLATELET MORPHOLOGY - Abnormal    RBC Morphology Confirmed RBC Indices      Platelet Assessment        Value: Automated Count Confirmed. Platelet morphology is normal.    [place] Cells Slight (*)    TROPONIN T, HIGH SENSITIVITY - Normal    Troponin T, High Sensitivity 9     LIPASE - Normal    Lipase 24     TROPONIN T, HIGH SENSITIVITY     XR Chest 2 Views   Final Result   IMPRESSION: Normal heart size and pulmonary vascularity. No focal infiltrates. No pleural effusions. Postoperative changes in the right shoulder.             Critical care was not performed.     Medical Decision Making  The patient's presentation was of moderate complexity (an acute illness with systemic symptoms).    The patient's evaluation involved:  review of external note(s) from 1 sources (see separate  area of note for details)  review of 3+ test result(s) ordered prior to this encounter (see separate area of note for details)  ordering and/or review of 3+ test(s) in this encounter (see separate area of note for details)  independent interpretation of testing performed by another health professional (see separate area of note for details)    The patient's management necessitated moderate risk (prescription drug management including medications given in the ED).    Assessment & Plan    Patient presents to the emergency department with upper epigastric/lower chest pain which has been going on constantly for 2 days.  Differential diagnosis could include GERD/gastritis which I tend to favor, also need to consider pneumothorax, pancreatitis, PE, GERD, musculoskeletal etiology, zoster, aortic pathology, amongst others.  EKG was done in the emergency department and demonstrates normal sinus rhythm without any signs of ectopy or ischemia.  We did establish IV access and we did draw blood for laboratory analysis. CBC shows white count of 6.4, normal hemoglobin, mild thrombocytopenia with platelets of 134, CMP essentially within normal limits with the exception of mild hypocalcemia with a calcium of 7.9, I do see that he has been mildly hypocalcemic several times in the past, lipase normal at 24, troponin within normal limits at 9.  GI cocktail and IV famotidine has been ordered.  Chest x-ray, per my read, is clear, radiology overread agrees.      Suspect that the patient has GERD which is causing symptoms for him.  Believe we have sufficiently ruled out ACS given ongoing symptoms for 2 days with unremarkable EKG and normal troponin.  Will start him on a PPI, I have advised him to follow-up with his primary clinic.  Return to the ED for new or worsening symptoms.  Patient verbalizes understanding.    I have reviewed the nursing notes. I have reviewed the findings, diagnosis, plan and need for follow up with the  patient.    Discharge Medication List as of 1/26/2025  6:34 PM        START taking these medications    Details   pantoprazole (PROTONIX) 20 MG EC tablet Take 1 tablet (20 mg) by mouth daily., Disp-30 tablet, R-0, Local PrintTake in the morning on an empty stomach at least half an hour prior to eating             Final diagnoses:   Epigastric pain         Kathe Feng MD  [facility] EMERGENCY DEPARTMENT  1/26/2025     Kathe Feng MD  01/26/25 [id number]

## 2025-01-27 LAB
ATRIAL RATE - MUSE: 64 BPM
DIASTOLIC BLOOD PRESSURE - MUSE: NORMAL MMHG
INTERPRETATION ECG - MUSE: NORMAL
P AXIS - MUSE: 77 DEGREES
PR INTERVAL - MUSE: 182 MS
QRS DURATION - MUSE: 86 MS
QT - MUSE: 402 MS
QTC - MUSE: 414 MS
R AXIS - MUSE: 16 DEGREES
SYSTOLIC BLOOD PRESSURE - MUSE: NORMAL MMHG
T AXIS - MUSE: 4 DEGREES
VENTRICULAR RATE- MUSE: 64 BPM

## 2025-01-27 NOTE — DISCHARGE INSTRUCTIONS
You have been seen in the emergency department today for your symptoms.  Your EKG and heart enzyme test are normal.  Your x-ray was read as normal, we do not see any problems with the heart or lungs on x-ray.  Your symptoms are probably due to heartburn or irritation of the inside lining of the stomach from extra acid.  We recommend that you take an antacid medication on a daily basis.  We have given you a prescription for this.  Please follow-up with your primary clinic.  If you find that this medication is helpful, you will need to get refills which are authorized by your primary clinic so be sure to follow-up for this.    Return to the emergency department for new or worsening symptoms.

## 2025-03-21 ENCOUNTER — HOSPITAL ENCOUNTER (EMERGENCY)
Facility: CLINIC | Age: 65
Discharge: HOME | End: 2025-03-21
Attending: FAMILY MEDICINE | Admitting: FAMILY MEDICINE
Payer: COMMERCIAL

## 2025-03-21 VITALS
OXYGEN SATURATION: 98 % | RESPIRATION RATE: 16 BRPM | TEMPERATURE: 98.1 F | SYSTOLIC BLOOD PRESSURE: 146 MMHG | HEART RATE: 61 BPM | DIASTOLIC BLOOD PRESSURE: 87 MMHG

## 2025-03-21 DIAGNOSIS — R39.89 URETHRAL PAIN: ICD-10-CM

## 2025-03-21 LAB
ALBUMIN UR-MCNC: NEGATIVE MG/DL
APPEARANCE UR: CLEAR
BILIRUB UR QL STRIP: NEGATIVE
C TRACH DNA SPEC QL NAA+PROBE: NEGATIVE
COLOR UR AUTO: ABNORMAL
GLUCOSE UR STRIP-MCNC: NEGATIVE MG/DL
HGB UR QL STRIP: NEGATIVE
KETONES UR STRIP-MCNC: NEGATIVE MG/DL
LEUKOCYTE ESTERASE UR QL STRIP: NEGATIVE
MUCOUS THREADS #/AREA URNS LPF: PRESENT /LPF
N GONORRHOEA DNA SPEC QL NAA+PROBE: NEGATIVE
NITRATE UR QL: NEGATIVE
PH UR STRIP: 5.5 [PH] (ref 5–7)
RBC URINE: <1 /HPF
SP GR UR STRIP: 1.02 (ref 1–1.03)
SPECIMEN TYPE: NORMAL
SPECIMEN TYPE: NORMAL
UROBILINOGEN UR STRIP-MCNC: NORMAL MG/DL
WBC URINE: <1 /HPF

## 2025-03-21 PROCEDURE — 87591 N.GONORRHOEAE DNA AMP PROB: CPT | Performed by: FAMILY MEDICINE

## 2025-03-21 PROCEDURE — 87491 CHLMYD TRACH DNA AMP PROBE: CPT | Performed by: FAMILY MEDICINE

## 2025-03-21 PROCEDURE — 87086 URINE CULTURE/COLONY COUNT: CPT | Performed by: FAMILY MEDICINE

## 2025-03-21 PROCEDURE — 99284 EMERGENCY DEPT VISIT MOD MDM: CPT | Performed by: FAMILY MEDICINE

## 2025-03-21 PROCEDURE — 99283 EMERGENCY DEPT VISIT LOW MDM: CPT | Performed by: FAMILY MEDICINE

## 2025-03-21 PROCEDURE — 81001 URINALYSIS AUTO W/SCOPE: CPT | Performed by: FAMILY MEDICINE

## 2025-03-21 RX ORDER — IBUPROFEN 800 MG/1
800 TABLET, FILM COATED ORAL EVERY 8 HOURS PRN
Qty: 15 TABLET | Refills: 0 | Status: SHIPPED | OUTPATIENT
Start: 2025-03-21 | End: 2025-03-26

## 2025-03-21 ASSESSMENT — COLUMBIA-SUICIDE SEVERITY RATING SCALE - C-SSRS
2. HAVE YOU ACTUALLY HAD ANY THOUGHTS OF KILLING YOURSELF IN THE PAST MONTH?: NO
1. IN THE PAST MONTH, HAVE YOU WISHED YOU WERE DEAD OR WISHED YOU COULD GO TO SLEEP AND NOT WAKE UP?: NO
6. HAVE YOU EVER DONE ANYTHING, STARTED TO DO ANYTHING, OR PREPARED TO DO ANYTHING TO END YOUR LIFE?: NO

## 2025-03-21 ASSESSMENT — ACTIVITIES OF DAILY LIVING (ADL)
ADLS_ACUITY_SCORE: 41

## 2025-03-21 NOTE — ED PROVIDER NOTES
[facility] EMERGENCY DEPARTMENT ([facility])    3/21/25      ED PROVIDER NOTE   ED 8  History     Chief Complaint   Patient presents with    Penis/Scrotum Problem     The history is provided by the patient and medical records.     Rodrigo Wallace is a 64 year old male who presents to the emergency department with penile pain when starting urine stream for the past week.  He has no penile pain with voiding per se.  Denies change in color or smell of urine, denies difficulty voiding.  He denies urethral discharge. Denies pain in the abdomen or testicles.      Past Medical History  Past Medical History:   Diagnosis Date    High cholesterol     Hypertension     Myocardial infarction (H) 2008    States he has 1 sent placed.     Past Surgical History:   Procedure Laterality Date    CARDIAC SURGERY  2008    Stent place through right groin     ibuprofen (ADVIL/MOTRIN) 800 MG tablet  aspirin (ASPIRIN 81) 81 MG chewable tablet  atorvastatin (LIPITOR) 80 MG tablet  diphenhydrAMINE (BENADRYL) 25 MG capsule  metoprolol tartrate (LOPRESSOR) 25 MG tablet  pantoprazole (PROTONIX) 20 MG EC tablet  predniSONE (DELTASONE) 20 MG tablet  ticagrelor (BRILINTA) 90 MG tablet      No Known Allergies  Family History  No family history on file.  Social History   Social History     Tobacco Use    Smoking status: Former    Smokeless tobacco: Never    Tobacco comments:     Quit smoking 2003   Substance Use Topics    Alcohol use: No    Drug use: No      A medically appropriate review of systems was performed with pertinent positives and negatives noted in the HPI, and all other systems negative.    Physical Exam   BP: 114/72  Pulse: 69  Temp: 98.1  F (36.7  C)  Resp: 17  SpO2: 96 %  Physical Exam  Constitutional:       General: He is not in acute distress.     Appearance: Normal appearance. He is not toxic-appearing.   HENT:      Head: Atraumatic.   Eyes:      General: No scleral icterus.     Conjunctiva/sclera: Conjunctivae normal.    Cardiovascular:      Rate and Rhythm: Normal rate.      Heart sounds: Normal heart sounds.   Pulmonary:      Effort: Pulmonary effort is normal. No respiratory distress.      Breath sounds: Normal breath sounds.   Abdominal:      Palpations: Abdomen is soft.      Tenderness: There is no abdominal tenderness.   Genitourinary:     Penis: Normal and circumcised. No erythema, tenderness, discharge, swelling or lesions.       Testes: Normal.      Epididymis:      Right: Normal.      Left: Normal.   Musculoskeletal:         General: No deformity.      Cervical back: Neck supple.   Skin:     General: Skin is warm.   Neurological:      General: No focal deficit present.      Mental Status: He is alert and oriented to person, place, and time.      Sensory: No sensory deficit.      Motor: No weakness.      Coordination: Coordination normal.           ED Course, Procedures, & Data      Procedures         Results for orders placed or performed during the hospital encounter of 03/21/25   UA with Microscopic reflex to Culture     Status: Abnormal    Specimen: Urine, Clean Catch   Result Value Ref Range    Color Urine Light Yellow Colorless, Straw, Light Yellow, Yellow    Appearance Urine Clear Clear    Glucose Urine Negative Negative mg/dL    Bilirubin Urine Negative Negative    Ketones Urine Negative Negative mg/dL    Specific Gravity Urine 1.020 1.003 - 1.035    Blood Urine Negative Negative    pH Urine 5.5 5.0 - 7.0    Protein Albumin Urine Negative Negative mg/dL    Urobilinogen Urine Normal Normal, 2.0 mg/dL    Nitrite Urine Negative Negative    Leukocyte Esterase Urine Negative Negative    Mucus Urine Present (A) None Seen /LPF    RBC Urine <1 <=2 /HPF    WBC Urine <1 <=5 /HPF    Narrative    Urine Culture not indicated   Chlamydia trachomatis PCR     Status: None    Specimen: Urine, Voided   Result Value Ref Range    Chlamydia trachomatis Negative Negative    Chlamydia trachomatis Specimen Source Urine, Voided     Neisseria gonorrhoea PCR     Status: None    Specimen: Urine, Voided   Result Value Ref Range    Neisseria gonorrhoeae Negative Negative    Neisseria gonorrhoeae Specimen Source Urine, Voided    Urine Culture     Status: None    Specimen: Urine, Clean Catch   Result Value Ref Range    Culture No Growth      Medications - No data to display  Labs Ordered and Resulted from Time of ED Arrival to Time of ED Departure   ROUTINE UA WITH MICROSCOPIC REFLEX TO CULTURE - Abnormal       Result Value    Color Urine Light Yellow      Appearance Urine Clear      Glucose Urine Negative      Bilirubin Urine Negative      Ketones Urine Negative      Specific Gravity Urine 1.020      Blood Urine Negative      pH Urine 5.5      Protein Albumin Urine Negative      Urobilinogen Urine Normal      Nitrite Urine Negative      Leukocyte Esterase Urine Negative      Mucus Urine Present (*)     RBC Urine <1      WBC Urine <1       No orders to display          Critical care was not performed.     Medical Decision Making  The patient's presentation was of moderate complexity (an acute illness with systemic symptoms).    The patient's evaluation involved:  ordering and/or review of 2 test(s) in this encounter (see separate area of note for details)    The patient's management necessitated moderate risk (prescription drug management including medications given in the ED).    Assessment & Plan    Rodrigo Wallace is a 64 year old male who presents with penile pain with starting urine stream.    I have reviewed the nursing notes. I have reviewed the findings, diagnosis, plan and need for follow up with the patient.    Discharge Medication List as of 3/21/2025  2:28 PM        START taking these medications    Details   ibuprofen (ADVIL/MOTRIN) 800 MG tablet Take 1 tablet (800 mg) by mouth every 8 hours as needed for moderate pain., Disp-15 tablet, R-0, E-Prescribe             Final diagnoses:   Urethral pain     I, Racquel Cruz, am serving  as a trained medical scribe to document services personally performed by Rajinder Austin MD based on the provider's statements to me on March 21, 2025.  This document has been checked and approved by the attending provider.    I, Rajinder Austin MD, was physically present and have reviewed and verified the accuracy of this note documented by Racquel Cruz, medical scribe.      Rajinder Austin MD   [facility] EMERGENCY DEPARTMENT  3/21/2025     Rajinder Austin MD  03/23/25 0913

## 2025-03-21 NOTE — ED TRIAGE NOTES
pt states he is having pain in his penis when he tries to void but once the urine begins to come out it is fine. Denies pain in the abd or testicles. States this has been going on for the last week denies change in color or smell of urine and denies urethral discharge. He also adds that when he does void he feels like he is able to empty his bladder.

## 2025-03-21 NOTE — ED TRIAGE NOTES
Triage Assessment (Adult)       Row Name 03/21/25 1112          Triage Assessment    Airway WDL WDL        Respiratory WDL    Respiratory WDL WDL        Skin Circulation/Temperature WDL    Skin Circulation/Temperature WDL WDL        Cardiac WDL    Cardiac WDL WDL        Peripheral/Neurovascular WDL    Peripheral Neurovascular WDL WDL        Cognitive/Neuro/Behavioral WDL    Cognitive/Neuro/Behavioral WDL WDL

## 2025-03-21 NOTE — DISCHARGE INSTRUCTIONS
Discharge to home May use anti-inflammatory awaiting culture results following up with your primary MD

## 2025-03-22 LAB — BACTERIA UR CULT: NO GROWTH

## 2025-06-04 ENCOUNTER — HOSPITAL ENCOUNTER (EMERGENCY)
Facility: CLINIC | Age: 65
Discharge: HOME | End: 2025-06-04
Attending: EMERGENCY MEDICINE | Admitting: EMERGENCY MEDICINE
Payer: COMMERCIAL

## 2025-06-04 VITALS
SYSTOLIC BLOOD PRESSURE: 138 MMHG | RESPIRATION RATE: 1 BRPM | HEART RATE: 72 BPM | OXYGEN SATURATION: 98 % | DIASTOLIC BLOOD PRESSURE: 77 MMHG | TEMPERATURE: 98.6 F | BODY MASS INDEX: 24.21 KG/M2 | WEIGHT: 150 LBS

## 2025-06-04 DIAGNOSIS — R07.9 CHEST PAIN, UNSPECIFIED TYPE: ICD-10-CM

## 2025-06-04 DIAGNOSIS — J18.9 COMMUNITY ACQUIRED PNEUMONIA, UNSPECIFIED LATERALITY: ICD-10-CM

## 2025-06-04 LAB
ALBUMIN SERPL BCG-MCNC: 4 G/DL (ref 3.5–5.2)
ALP SERPL-CCNC: 89 U/L (ref 40–150)
ALT SERPL W P-5'-P-CCNC: 21 U/L (ref 0–70)
ANION GAP SERPL CALCULATED.3IONS-SCNC: 11 MMOL/L (ref 7–15)
AST SERPL W P-5'-P-CCNC: 30 U/L (ref 0–45)
ATRIAL RATE - MUSE: 63 BPM
BASOPHILS # BLD AUTO: 0.1 10E3/UL (ref 0–0.2)
BASOPHILS NFR BLD AUTO: 1 %
BILIRUB SERPL-MCNC: 0.2 MG/DL
BUN SERPL-MCNC: 21.8 MG/DL (ref 8–23)
CALCIUM SERPL-MCNC: 8.7 MG/DL (ref 8.8–10.4)
CHLORIDE SERPL-SCNC: 106 MMOL/L (ref 98–107)
CREAT SERPL-MCNC: 0.71 MG/DL (ref 0.67–1.17)
DIASTOLIC BLOOD PRESSURE - MUSE: NORMAL MMHG
EGFRCR SERPLBLD CKD-EPI 2021: >90 ML/MIN/1.73M2
EOSINOPHIL # BLD AUTO: 0.5 10E3/UL (ref 0–0.7)
EOSINOPHIL NFR BLD AUTO: 5 %
ERYTHROCYTE [DISTWIDTH] IN BLOOD BY AUTOMATED COUNT: 12.9 % (ref 10–15)
GLUCOSE SERPL-MCNC: 99 MG/DL (ref 70–99)
HCO3 SERPL-SCNC: 24 MMOL/L (ref 22–29)
HCT VFR BLD AUTO: 38.9 % (ref 40–53)
HGB BLD-MCNC: 13 G/DL (ref 13.3–17.7)
HOLD SPECIMEN: NORMAL
IMM GRANULOCYTES # BLD: 0 10E3/UL
IMM GRANULOCYTES NFR BLD: 0 %
INTERPRETATION ECG - MUSE: NORMAL
LYMPHOCYTES # BLD AUTO: 1.6 10E3/UL (ref 0.8–5.3)
LYMPHOCYTES NFR BLD AUTO: 17 %
MCH RBC QN AUTO: 29 PG (ref 26.5–33)
MCHC RBC AUTO-ENTMCNC: 33.4 G/DL (ref 31.5–36.5)
MCV RBC AUTO: 87 FL (ref 78–100)
MONOCYTES # BLD AUTO: 0.4 10E3/UL (ref 0–1.3)
MONOCYTES NFR BLD AUTO: 4 %
NEUTROPHILS # BLD AUTO: 7.2 10E3/UL (ref 1.6–8.3)
NEUTROPHILS NFR BLD AUTO: 73 %
NRBC # BLD AUTO: 0 10E3/UL
NRBC BLD AUTO-RTO: 0 /100
P AXIS - MUSE: 50 DEGREES
PLATELET # BLD AUTO: 197 10E3/UL (ref 150–450)
POTASSIUM SERPL-SCNC: 4 MMOL/L (ref 3.4–5.3)
PR INTERVAL - MUSE: 140 MS
PROT SERPL-MCNC: 6.8 G/DL (ref 6.4–8.3)
QRS DURATION - MUSE: 86 MS
QT - MUSE: 398 MS
QTC - MUSE: 407 MS
R AXIS - MUSE: 18 DEGREES
RBC # BLD AUTO: 4.49 10E6/UL (ref 4.4–5.9)
SODIUM SERPL-SCNC: 141 MMOL/L (ref 135–145)
SYSTOLIC BLOOD PRESSURE - MUSE: NORMAL MMHG
T AXIS - MUSE: 15 DEGREES
TROPONIN T SERPL HS-MCNC: <6 NG/L
TROPONIN T SERPL HS-MCNC: <6 NG/L
VENTRICULAR RATE- MUSE: 63 BPM
WBC # BLD AUTO: 9.8 10E3/UL (ref 4–11)

## 2025-06-04 PROCEDURE — 99284 EMERGENCY DEPT VISIT MOD MDM: CPT | Performed by: EMERGENCY MEDICINE

## 2025-06-04 PROCEDURE — 99285 EMERGENCY DEPT VISIT HI MDM: CPT | Mod: 25 | Performed by: EMERGENCY MEDICINE

## 2025-06-04 PROCEDURE — 84484 ASSAY OF TROPONIN QUANT: CPT | Performed by: EMERGENCY MEDICINE

## 2025-06-04 PROCEDURE — 93010 ELECTROCARDIOGRAM REPORT: CPT | Performed by: EMERGENCY MEDICINE

## 2025-06-04 PROCEDURE — 250N000013 HC RX MED GY IP 250 OP 250 PS 637: Performed by: EMERGENCY MEDICINE

## 2025-06-04 PROCEDURE — 80051 ELECTROLYTE PANEL: CPT | Performed by: EMERGENCY MEDICINE

## 2025-06-04 PROCEDURE — 85041 AUTOMATED RBC COUNT: CPT | Performed by: EMERGENCY MEDICINE

## 2025-06-04 PROCEDURE — 36415 COLL VENOUS BLD VENIPUNCTURE: CPT | Performed by: EMERGENCY MEDICINE

## 2025-06-04 PROCEDURE — 93005 ELECTROCARDIOGRAM TRACING: CPT | Performed by: EMERGENCY MEDICINE

## 2025-06-04 RX ORDER — ASPIRIN 81 MG/1
81 TABLET, CHEWABLE ORAL ONCE
Status: COMPLETED | OUTPATIENT
Start: 2025-06-04 | End: 2025-06-04

## 2025-06-04 RX ORDER — NITROGLYCERIN 0.4 MG/1
0.4 TABLET SUBLINGUAL EVERY 5 MIN PRN
Status: DISCONTINUED | OUTPATIENT
Start: 2025-06-04 | End: 2025-06-04 | Stop reason: HOSPADM

## 2025-06-04 RX ORDER — AZITHROMYCIN 250 MG/1
TABLET, FILM COATED ORAL
Qty: 6 TABLET | Refills: 0 | Status: SHIPPED | OUTPATIENT
Start: 2025-06-04 | End: 2025-06-09

## 2025-06-04 RX ADMIN — ASPIRIN 81 MG CHEWABLE TABLET 81 MG: 81 TABLET CHEWABLE at 02:33

## 2025-06-04 RX ADMIN — AMOXICILLIN AND CLAVULANATE POTASSIUM 1 TABLET: 875; 125 TABLET, FILM COATED ORAL at 03:32

## 2025-06-04 RX ADMIN — NITROGLYCERIN 0.4 MG: 0.4 TABLET, ORALLY DISINTEGRATING SUBLINGUAL at 03:13

## 2025-06-04 RX ADMIN — NITROGLYCERIN 0.4 MG: 0.4 TABLET, ORALLY DISINTEGRATING SUBLINGUAL at 02:34

## 2025-06-04 ASSESSMENT — COLUMBIA-SUICIDE SEVERITY RATING SCALE - C-SSRS
6. HAVE YOU EVER DONE ANYTHING, STARTED TO DO ANYTHING, OR PREPARED TO DO ANYTHING TO END YOUR LIFE?: NO
1. IN THE PAST MONTH, HAVE YOU WISHED YOU WERE DEAD OR WISHED YOU COULD GO TO SLEEP AND NOT WAKE UP?: NO
2. HAVE YOU ACTUALLY HAD ANY THOUGHTS OF KILLING YOURSELF IN THE PAST MONTH?: NO

## 2025-06-04 ASSESSMENT — ACTIVITIES OF DAILY LIVING (ADL)
ADLS_ACUITY_SCORE: 41

## 2025-06-04 NOTE — ED TRIAGE NOTES
Pt comes to the ED with a complaint of chest pain/pressure that woke him from his sleep. Pain is rated an 8/10. Pt says the has felt this pain twice before. Pt reports he can feel the pain also in the left side of his back and some SOB.     Triage Assessment (Adult)       Row Name 06/04/25 0149          Triage Assessment    Airway WDL WDL        Respiratory WDL    Respiratory WDL WDL        Cardiac WDL    Cardiac WDL X;all        Chest Pain Assessment    Chest Pain Location anterior chest, left     Chest Pain Radiation back     Character pressure     Precipitating Factors other (see comments)  Pt says pain is constant nothing makes it better or worse.        Peripheral/Neurovascular WDL    Peripheral Neurovascular WDL WDL        Cognitive/Neuro/Behavioral WDL    Cognitive/Neuro/Behavioral WDL WDL

## 2025-06-04 NOTE — ED PROVIDER NOTES
[facility] EMERGENCY DEPARTMENT ([facility])    6/04/25          History     Chief Complaint   Patient presents with    Chest Pain     Pt says he woke up from his sleep having left sided chest pain. Pt describes this pain as pressure, rated 8/10.      HPI  Rodrigo Wallace is a 64 year old male who with PMH of myocardial infarction, HTN, high cholestrol and hyperlipidemia presents to the ED due to left-sided chest pressure and heaviness which has been going on for little over 2 hours.  He states that he woke from sleep around midnight with the left side of his chest feeling heavy.  Perhaps some mild shortness of breath.  He states he has occasional mild cough at baseline, which is unchanged.  No fever.  No abdominal pain, nausea, vomiting, diarrhea.  No diaphoresis.  No other complaints.  He states that he does think this feels similar to when he had previous cardiac issues.  He did take 3 baby aspirin at home prior to coming in.  He denies any history of DVT or PE, lower extremity pain or swelling, recent travel, surgery, bedrest.    Past Medical History  Past Medical History:   Diagnosis Date    High cholesterol     Hypertension     Myocardial infarction (H) 2008    States he has 1 sent placed.     Past Surgical History:   Procedure Laterality Date    CARDIAC SURGERY  2008    Stent place through right groin     aspirin (ASPIRIN 81) 81 MG chewable tablet  atorvastatin (LIPITOR) 80 MG tablet  amoxicillin-clavulanate (AUGMENTIN) 875-125 MG tablet  azithromycin (ZITHROMAX Z-MARGARET) 250 MG tablet  diphenhydrAMINE (BENADRYL) 25 MG capsule  metoprolol tartrate (LOPRESSOR) 25 MG tablet  pantoprazole (PROTONIX) 20 MG EC tablet  predniSONE (DELTASONE) 20 MG tablet  ticagrelor (BRILINTA) 90 MG tablet      No Known Allergies  Family History  History reviewed. No pertinent family history.  Social History   Social History     Tobacco Use    Smoking status: Former    Smokeless tobacco: Never    Tobacco comments:     Quit  smoking 2003   Substance Use Topics    Alcohol use: No    Drug use: No      Past medical history, past surgical history, medications, allergies, family history, and social history were reviewed with the patient. No additional pertinent items.   A complete review of systems was performed with pertinent positives and negatives noted in the HPI, and all other systems negative.    Physical Exam   BP: (!) 143/66  Pulse: 68  Temp: 98  F (36.7  C)  Resp: 16  Weight: 68 kg (150 lb)  SpO2: 97 %  Physical Exam  Constitutional:       General: He is not in acute distress.     Appearance: Normal appearance. He is not toxic-appearing.   HENT:      Head: Atraumatic.   Eyes:      General: No scleral icterus.     Conjunctiva/sclera: Conjunctivae normal.   Cardiovascular:      Rate and Rhythm: Normal rate.      Heart sounds: Normal heart sounds.   Pulmonary:      Effort: Pulmonary effort is normal. No respiratory distress.      Breath sounds: Normal breath sounds.   Abdominal:      Palpations: Abdomen is soft.      Tenderness: There is no abdominal tenderness.   Musculoskeletal:         General: No tenderness or deformity.      Cervical back: Neck supple.   Skin:     General: Skin is warm.   Neurological:      Mental Status: He is alert.           ED Course, Procedures, & Data      Procedures            EKG Interpretation:      Interpreted by Dyan Hester MD  Time reviewed: 0155  Symptoms at time of EKG: left sided chest pain   Rhythm: normal sinus   Rate: normal  Axis: normal  Ectopy: none  Conduction: normal  ST Segments/ T Waves: No ST-T wave changes  Q Waves: none  Comparison to prior: R waves less tall in V2 than previous - may be due to lead placement    Clinical Impression: normal EKG           Results for orders placed or performed during the hospital encounter of 06/04/25   Chest XR,  PA & LAT     Status: None    Narrative    EXAM: XR CHEST 2 VIEWS  LOCATION: [facility]  CENTER  DATE/TIME: 6/4/2025 2:57 AM CDT    INDICATION: Chest pain.  COMPARISON: Chest x-ray on 1/26/2025.      Impression    IMPRESSION: PA and lateral views of the chest were obtained. Cardiomediastinal silhouette is within normal limits. Right basilar/perihilar pulmonary opacities, indeterminate, could be atelectatic or infectious. No significant pleural effusion or   pneumothorax.       [place] Draw     Status: None    Narrative    The following orders were created for panel order [place] Draw.  Procedure                               Abnormality         Status                     ---------                               -----------         ------                     Extra Blue Top Tube[[id number]]                             Final result               Extra Red Top Tube[[id number]]                              Final result               Extra Green Top (Lithiu...[[id number]]                      Final result               Extra Purple Top Tube[[id number]]                           Final result                 Please view results for these tests on the individual orders.   Extra Blue Top Tube     Status: None   Result Value Ref Range    Hold Specimen JIC    Extra Red Top Tube     Status: None   Result Value Ref Range    Hold Specimen JIC    Extra Green Top (Lithium Heparin) Tube     Status: None   Result Value Ref Range    Hold Specimen JIC    Extra Purple Top Tube     Status: None   Result Value Ref Range    Hold Specimen JIC    Comprehensive metabolic panel     Status: Abnormal   Result Value Ref Range    Sodium 141 135 - 145 mmol/L    Potassium 4.0 3.4 - 5.3 mmol/L    Carbon Dioxide (CO2) 24 22 - 29 mmol/L    Anion Gap 11 7 - 15 mmol/L    Urea Nitrogen 21.8 8.0 - 23.0 mg/dL    Creatinine 0.71 0.67 - 1.17 mg/dL    GFR Estimate >90 >60 mL/min/1.73m2    Calcium 8.7 (L) 8.8 - 10.4 mg/dL    Chloride 106 98 - 107 mmol/L    Glucose 99 70 - 99 mg/dL    Alkaline Phosphatase 89 40 - 150 U/L    AST 30 0 - 45 U/L    ALT 21  0 - 70 U/L    Protein Total 6.8 6.4 - 8.3 g/dL    Albumin 4.0 3.5 - 5.2 g/dL    Bilirubin Total 0.2 <=1.2 mg/dL   Troponin T, High Sensitivity     Status: Normal   Result Value Ref Range    Troponin T, High Sensitivity <6 <=22 ng/L   CBC with platelets and differential     Status: Abnormal   Result Value Ref Range    WBC Count 9.8 4.0 - 11.0 10e3/uL    RBC Count 4.49 4.40 - 5.90 10e6/uL    Hemoglobin 13.0 (L) 13.3 - 17.7 g/dL    Hematocrit 38.9 (L) 40.0 - 53.0 %    MCV 87 78 - 100 fL    MCH 29.0 26.5 - 33.0 pg    MCHC 33.4 31.5 - 36.5 g/dL    RDW 12.9 10.0 - 15.0 %    Platelet Count 197 150 - 450 10e3/uL    % Neutrophils 73 %    % Lymphocytes 17 %    % Monocytes 4 %    % Eosinophils 5 %    % Basophils 1 %    % Immature Granulocytes 0 %    NRBCs per 100 WBC 0 <1 /100    Absolute Neutrophils 7.2 1.6 - 8.3 10e3/uL    Absolute Lymphocytes 1.6 0.8 - 5.3 10e3/uL    Absolute Monocytes 0.4 0.0 - 1.3 10e3/uL    Absolute Eosinophils 0.5 0.0 - 0.7 10e3/uL    Absolute Basophils 0.1 0.0 - 0.2 10e3/uL    Absolute Immature Granulocytes 0.0 <=0.4 10e3/uL    Absolute NRBCs 0.0 10e3/uL   Troponin T, High Sensitivity     Status: Normal   Result Value Ref Range    Troponin T, High Sensitivity <6 <=22 ng/L   EKG 12 lead     Status: None (Preliminary result)   Result Value Ref Range    Systolic Blood Pressure  mmHg    Diastolic Blood Pressure  mmHg    Ventricular Rate 63 BPM    Atrial Rate 63 BPM    MD Interval 140 ms    QRS Duration 86 ms     ms    QTc 407 ms    P Axis 50 degrees    R AXIS 18 degrees    T Axis 15 degrees    Interpretation ECG Sinus rhythm  Normal ECG      CBC with platelets differential     Status: Abnormal    Narrative    The following orders were created for panel order CBC with platelets differential.  Procedure                               Abnormality         Status                     ---------                               -----------         ------                     CBC with platelets and ...[[id number]]   Abnormal            Final result                 Please view results for these tests on the individual orders.     Medications   nitroGLYcerin (NITROSTAT) sublingual tablet 0.4 mg (0.4 mg Sublingual $Given 6/4/25 8530)   aspirin (ASA) chewable tablet 81 mg (81 mg Oral $Given 6/4/25 4500)   amoxicillin-clavulanate (AUGMENTIN) 875-125 MG per tablet 1 tablet (1 tablet Oral $Given 6/4/25 6058)     Labs Ordered and Resulted from Time of ED Arrival to Time of ED Departure   COMPREHENSIVE METABOLIC PANEL - Abnormal       Result Value    Sodium 141      Potassium 4.0      Carbon Dioxide (CO2) 24      Anion Gap 11      Urea Nitrogen 21.8      Creatinine 0.71      GFR Estimate >90      Calcium 8.7 (*)     Chloride 106      Glucose 99      Alkaline Phosphatase 89      AST 30      ALT 21      Protein Total 6.8      Albumin 4.0      Bilirubin Total 0.2     CBC WITH PLATELETS AND DIFFERENTIAL - Abnormal    WBC Count 9.8      RBC Count 4.49      Hemoglobin 13.0 (*)     Hematocrit 38.9 (*)     MCV 87      MCH 29.0      MCHC 33.4      RDW 12.9      Platelet Count 197      % Neutrophils 73      % Lymphocytes 17      % Monocytes 4      % Eosinophils 5      % Basophils 1      % Immature Granulocytes 0      NRBCs per 100 WBC 0      Absolute Neutrophils 7.2      Absolute Lymphocytes 1.6      Absolute Monocytes 0.4      Absolute Eosinophils 0.5      Absolute Basophils 0.1      Absolute Immature Granulocytes 0.0      Absolute NRBCs 0.0     TROPONIN T, HIGH SENSITIVITY - Normal    Troponin T, High Sensitivity <6     TROPONIN T, HIGH SENSITIVITY - Normal    Troponin T, High Sensitivity <6       Chest XR,  PA & LAT   Final Result   IMPRESSION: PA and lateral views of the chest were obtained. Cardiomediastinal silhouette is within normal limits. Right basilar/perihilar pulmonary opacities, indeterminate, could be atelectatic or infectious. No significant pleural effusion or    pneumothorax.                  Critical care was not performed.      Medical Decision Making  The patient's presentation was of moderate complexity (an acute illness with systemic symptoms).    The patient's evaluation involved:  review of external note(s) from 2 sources (previous notes)  review of 3+ test result(s) ordered prior to this encounter (previous results)  ordering and/or review of 3+ test(s) in this encounter (see separate area of note for details)  independent interpretation of testing performed by another health professional (chest x-ray independently reviewed and interpreted)    The patient's management necessitated moderate risk (prescription drug management including medications given in the ED).    Assessment & Plan    The patient does have a history of previous known coronary disease with intervention.  EKG was done here and is normal though.  He did take aspirin at home, was given 1 additional baby aspirin as he took 3.  He was given 2 nitroglycerin total.  Discomfort was dissipating, and it is unclear whether it simply dissipated with time or if the nitro helped take it away, though subsequently he notes that he is pain-free.  Chest x-ray was done which does show some concern for perihilar infiltrate on the right.  Initial troponin was less than 6, but given his cardiac history and relatively short time of onset, I did repeat this, repeat remains less than six 2-hour delta troponin.  Given this, concern for acute coronary syndrome is low.  The patient is completely symptom-free now.  He had reported he has had a little bit of a cough, although does state he seems to have a little bit of a chronic cough.  However, given the chest x-ray finding, chest pain, I think is reasonable to treat him for pneumonia.  He was given a dose of Augmentin here, will be discharged with a Z-Lev as well as Augmentin.  Encouraged follow-up with his outpatient provider within the next few days, return with any new or worsening symptoms, any other concerns.  Nothing to suggest PE or  dissection, particularly light of the fact that his pain is now completely resolved.  He does verbalize understanding and is agreeable to the plan.    Dictation Disclaimer: Some of this Note has been completed with voice-recognition dictation software. Although errors are generally corrected real-time, there is the potential for a rare error to be present in the completed chart.      I have reviewed the nursing notes. I have reviewed the findings, diagnosis, plan and need for follow up with the patient.    New Prescriptions    AMOXICILLIN-CLAVULANATE (AUGMENTIN) 875-125 MG TABLET    Take 1 tablet by mouth 2 times daily for 7 days.    AZITHROMYCIN (ZITHROMAX Z-MARGARET) 250 MG TABLET    Two tablets on the first day, then one tablet daily for the next 4 days       Final diagnoses:   Chest pain, unspecified type   Community acquired pneumonia, unspecified laterality         [facility] EMERGENCY DEPARTMENT  6/4/2025     Dyan Hester MD  06/04/25 1835

## 2025-06-04 NOTE — LETTER
June 4, 2025      To Whom It May Concern:      Rodrigo Wallace was seen in our Emergency Department today, 06/04/25.  I expect his condition to improve over the next 1 days.  He may return to work/school when improved.    Sincerely,        Dyan Hester MD  Electronically signed

## 2025-06-04 NOTE — DISCHARGE INSTRUCTIONS
Use the medications as prescribed. Follow up with your clinic doctor within the next few days. Return with any new or worsening symptoms or any other concerns.

## 2025-06-04 NOTE — Clinical Note
[facility] EMERGENCY DEPARTMENT  [address]  [facility] [place] [place] [address]  Phone: [phone]  Fax: [phone]    June 4, 2025        Rodrigo Wallace  [address] [facility] [address]          To whom it may concern:    RE: Rodrigo Wallace    {[phone]}    Please contact me for questions or concerns.      Sincerely,      Dyan Hester MD

## 2025-06-12 LAB
ATRIAL RATE - MUSE: 63 BPM
DIASTOLIC BLOOD PRESSURE - MUSE: NORMAL MMHG
INTERPRETATION ECG - MUSE: NORMAL
P AXIS - MUSE: 50 DEGREES
PR INTERVAL - MUSE: 140 MS
QRS DURATION - MUSE: 86 MS
QT - MUSE: 398 MS
QTC - MUSE: 407 MS
R AXIS - MUSE: 18 DEGREES
SYSTOLIC BLOOD PRESSURE - MUSE: NORMAL MMHG
T AXIS - MUSE: 15 DEGREES
VENTRICULAR RATE- MUSE: 63 BPM

## (undated) RX ORDER — NITROGLYCERIN 5 MG/ML
VIAL (ML) INTRAVENOUS
Status: DISPENSED
Start: 2018-03-23

## (undated) RX ORDER — FENTANYL CITRATE 50 UG/ML
INJECTION, SOLUTION INTRAMUSCULAR; INTRAVENOUS
Status: DISPENSED
Start: 2018-03-23

## (undated) RX ORDER — LIDOCAINE HYDROCHLORIDE 10 MG/ML
INJECTION, SOLUTION EPIDURAL; INFILTRATION; INTRACAUDAL; PERINEURAL
Status: DISPENSED
Start: 2018-03-23

## (undated) RX ORDER — HEPARIN SODIUM 1000 [USP'U]/ML
INJECTION, SOLUTION INTRAVENOUS; SUBCUTANEOUS
Status: DISPENSED
Start: 2018-03-23